# Patient Record
Sex: MALE | Race: WHITE | NOT HISPANIC OR LATINO | Employment: OTHER | URBAN - METROPOLITAN AREA
[De-identification: names, ages, dates, MRNs, and addresses within clinical notes are randomized per-mention and may not be internally consistent; named-entity substitution may affect disease eponyms.]

---

## 2017-08-10 ENCOUNTER — COMMUNICATION - HEALTHEAST (OUTPATIENT)
Dept: FAMILY MEDICINE | Facility: CLINIC | Age: 67
End: 2017-08-10

## 2017-08-15 ENCOUNTER — OFFICE VISIT - HEALTHEAST (OUTPATIENT)
Dept: FAMILY MEDICINE | Facility: CLINIC | Age: 67
End: 2017-08-15

## 2017-08-15 DIAGNOSIS — G56.22 ULNAR NEUROPATHY OF LEFT UPPER EXTREMITY: ICD-10-CM

## 2017-09-07 ENCOUNTER — OFFICE VISIT - HEALTHEAST (OUTPATIENT)
Dept: FAMILY MEDICINE | Facility: CLINIC | Age: 67
End: 2017-09-07

## 2017-09-07 DIAGNOSIS — G56.22 ULNAR NEUROPATHY OF LEFT UPPER EXTREMITY: ICD-10-CM

## 2017-09-07 DIAGNOSIS — Z01.818 PREOP EXAMINATION: ICD-10-CM

## 2017-09-07 DIAGNOSIS — H26.9 CATARACT: ICD-10-CM

## 2017-09-07 ASSESSMENT — MIFFLIN-ST. JEOR: SCORE: 1447.68

## 2017-10-09 ENCOUNTER — RECORDS - HEALTHEAST (OUTPATIENT)
Dept: ADMINISTRATIVE | Facility: OTHER | Age: 67
End: 2017-10-09

## 2017-10-11 ENCOUNTER — OFFICE VISIT - HEALTHEAST (OUTPATIENT)
Dept: FAMILY MEDICINE | Facility: CLINIC | Age: 67
End: 2017-10-11

## 2017-10-11 DIAGNOSIS — S60.451A FOREIGN BODY OF LEFT INDEX FINGER: ICD-10-CM

## 2018-04-25 ENCOUNTER — OFFICE VISIT - HEALTHEAST (OUTPATIENT)
Dept: FAMILY MEDICINE | Facility: CLINIC | Age: 68
End: 2018-04-25

## 2018-04-25 DIAGNOSIS — M77.02 MEDIAL EPICONDYLITIS OF ELBOW, LEFT: ICD-10-CM

## 2018-04-25 DIAGNOSIS — J43.9 EMPHYSEMA OF LUNG (H): ICD-10-CM

## 2018-04-25 DIAGNOSIS — Z00.00 HEALTH CARE MAINTENANCE: ICD-10-CM

## 2018-04-25 DIAGNOSIS — Z72.0 TOBACCO USE: ICD-10-CM

## 2018-04-25 DIAGNOSIS — F17.200 TOBACCO USE DISORDER: ICD-10-CM

## 2018-05-01 ENCOUNTER — RECORDS - HEALTHEAST (OUTPATIENT)
Dept: ADMINISTRATIVE | Facility: OTHER | Age: 68
End: 2018-05-01

## 2018-05-03 ENCOUNTER — HOSPITAL ENCOUNTER (OUTPATIENT)
Dept: CT IMAGING | Facility: HOSPITAL | Age: 68
Discharge: HOME | End: 2018-05-03
Attending: FAMILY MEDICINE

## 2018-05-03 DIAGNOSIS — Z72.0 TOBACCO USE: ICD-10-CM

## 2018-05-07 ENCOUNTER — COMMUNICATION - HEALTHEAST (OUTPATIENT)
Dept: FAMILY MEDICINE | Facility: CLINIC | Age: 68
End: 2018-05-07

## 2018-05-07 DIAGNOSIS — R19.5 POSITIVE COLORECTAL CANCER SCREENING USING DNA-BASED STOOL TEST: ICD-10-CM

## 2018-05-22 ENCOUNTER — OFFICE VISIT - HEALTHEAST (OUTPATIENT)
Dept: FAMILY MEDICINE | Facility: CLINIC | Age: 68
End: 2018-05-22

## 2018-05-22 DIAGNOSIS — G56.22 ULNAR NEUROPATHY OF LEFT UPPER EXTREMITY: ICD-10-CM

## 2018-07-12 ENCOUNTER — RECORDS - HEALTHEAST (OUTPATIENT)
Dept: ADMINISTRATIVE | Facility: OTHER | Age: 68
End: 2018-07-12

## 2018-07-24 ENCOUNTER — AMBULATORY - HEALTHEAST (OUTPATIENT)
Dept: PHYSICAL MEDICINE AND REHAB | Facility: CLINIC | Age: 68
End: 2018-07-24

## 2018-07-24 ENCOUNTER — OFFICE VISIT - HEALTHEAST (OUTPATIENT)
Dept: FAMILY MEDICINE | Facility: CLINIC | Age: 68
End: 2018-07-24

## 2018-07-24 DIAGNOSIS — G56.22 ULNAR NEUROPATHY OF LEFT UPPER EXTREMITY: ICD-10-CM

## 2018-07-24 DIAGNOSIS — D12.6 ADENOMATOUS COLON POLYP: ICD-10-CM

## 2018-08-09 ENCOUNTER — RECORDS - HEALTHEAST (OUTPATIENT)
Dept: ADMINISTRATIVE | Facility: OTHER | Age: 68
End: 2018-08-09

## 2018-08-09 ENCOUNTER — HOSPITAL ENCOUNTER (OUTPATIENT)
Dept: PHYSICAL MEDICINE AND REHAB | Facility: CLINIC | Age: 68
Discharge: HOME | End: 2018-08-09
Attending: FAMILY MEDICINE

## 2018-08-09 DIAGNOSIS — G56.22 ULNAR NEUROPATHY OF LEFT UPPER EXTREMITY: ICD-10-CM

## 2018-08-10 ENCOUNTER — AMBULATORY - HEALTHEAST (OUTPATIENT)
Dept: FAMILY MEDICINE | Facility: CLINIC | Age: 68
End: 2018-08-10

## 2018-08-10 DIAGNOSIS — G56.22 ULNAR NEUROPATHY OF LEFT UPPER EXTREMITY: ICD-10-CM

## 2018-08-15 ENCOUNTER — RECORDS - HEALTHEAST (OUTPATIENT)
Dept: ADMINISTRATIVE | Facility: OTHER | Age: 68
End: 2018-08-15

## 2018-09-12 ENCOUNTER — RECORDS - HEALTHEAST (OUTPATIENT)
Dept: ADMINISTRATIVE | Facility: OTHER | Age: 68
End: 2018-09-12

## 2018-09-19 ENCOUNTER — OFFICE VISIT - HEALTHEAST (OUTPATIENT)
Dept: FAMILY MEDICINE | Facility: CLINIC | Age: 68
End: 2018-09-19

## 2018-09-19 DIAGNOSIS — G56.22 ULNAR NEUROPATHY OF LEFT UPPER EXTREMITY: ICD-10-CM

## 2018-09-19 DIAGNOSIS — Z01.818 PREOP GENERAL PHYSICAL EXAM: ICD-10-CM

## 2018-09-19 DIAGNOSIS — F17.200 TOBACCO USE DISORDER: ICD-10-CM

## 2018-09-19 LAB
ATRIAL RATE - MUSE: 66 BPM
DIASTOLIC BLOOD PRESSURE - MUSE: NORMAL MMHG
ERYTHROCYTE [DISTWIDTH] IN BLOOD BY AUTOMATED COUNT: 10.8 % (ref 11–14.5)
HCT VFR BLD AUTO: 40 % (ref 40–54)
HGB BLD-MCNC: 13.3 G/DL (ref 14–18)
INTERPRETATION ECG - MUSE: NORMAL
MCH RBC QN AUTO: 31.8 PG (ref 27–34)
MCHC RBC AUTO-ENTMCNC: 33.2 G/DL (ref 32–36)
MCV RBC AUTO: 96 FL (ref 80–100)
P AXIS - MUSE: 36 DEGREES
PLATELET # BLD AUTO: 291 THOU/UL (ref 140–440)
PMV BLD AUTO: 6.5 FL (ref 7–10)
PR INTERVAL - MUSE: 118 MS
QRS DURATION - MUSE: 94 MS
QT - MUSE: 386 MS
QTC - MUSE: 404 MS
R AXIS - MUSE: 14 DEGREES
RBC # BLD AUTO: 4.18 MILL/UL (ref 4.4–6.2)
SYSTOLIC BLOOD PRESSURE - MUSE: NORMAL MMHG
T AXIS - MUSE: 42 DEGREES
VENTRICULAR RATE- MUSE: 66 BPM
WBC: 4.7 THOU/UL (ref 4–11)

## 2018-09-19 ASSESSMENT — MIFFLIN-ST. JEOR: SCORE: 1396.65

## 2018-09-27 ENCOUNTER — RECORDS - HEALTHEAST (OUTPATIENT)
Dept: ADMINISTRATIVE | Facility: OTHER | Age: 68
End: 2018-09-27

## 2018-10-10 ENCOUNTER — RECORDS - HEALTHEAST (OUTPATIENT)
Dept: ADMINISTRATIVE | Facility: OTHER | Age: 68
End: 2018-10-10

## 2019-01-21 ENCOUNTER — COMMUNICATION - HEALTHEAST (OUTPATIENT)
Dept: FAMILY MEDICINE | Facility: CLINIC | Age: 69
End: 2019-01-21

## 2019-01-21 DIAGNOSIS — N52.9 ERECTILE DYSFUNCTION, UNSPECIFIED ERECTILE DYSFUNCTION TYPE: ICD-10-CM

## 2019-06-04 ENCOUNTER — OFFICE VISIT - HEALTHEAST (OUTPATIENT)
Dept: PODIATRY | Facility: CLINIC | Age: 69
End: 2019-06-04

## 2019-06-04 DIAGNOSIS — L60.0 INGROWN TOENAIL: ICD-10-CM

## 2019-06-12 ENCOUNTER — OFFICE VISIT - HEALTHEAST (OUTPATIENT)
Dept: PODIATRY | Facility: CLINIC | Age: 69
End: 2019-06-12

## 2019-06-12 DIAGNOSIS — L60.0 INGROWN TOENAIL: ICD-10-CM

## 2019-06-12 ASSESSMENT — MIFFLIN-ST. JEOR: SCORE: 1396.65

## 2019-11-13 ENCOUNTER — OFFICE VISIT - HEALTHEAST (OUTPATIENT)
Dept: FAMILY MEDICINE | Facility: CLINIC | Age: 69
End: 2019-11-13

## 2019-11-13 DIAGNOSIS — S61.011A LACERATION OF RIGHT THUMB WITHOUT FOREIGN BODY WITHOUT DAMAGE TO NAIL, INITIAL ENCOUNTER: ICD-10-CM

## 2020-06-16 PROBLEM — D12.6 ADENOMATOUS COLON POLYP: Status: ACTIVE | Noted: 2018-07-16

## 2020-06-16 PROBLEM — F17.200 NICOTINE DEPENDENCE: Status: ACTIVE | Noted: 2020-06-16

## 2020-06-17 ASSESSMENT — MIFFLIN-ST. JEOR: SCORE: 1409.03

## 2020-06-17 NOTE — PROGRESS NOTES
Chief Complaint   Patient presents with     Consult For     Possible ulnar neuropathy-was seen at [facility] in january 2020 and states symptoms have continued in left arm      Patient will be coming in face to face.  EMG after. Passed COVID screening during precharting.   Demarco Santos on 6/17/2020 at 10:38 AM

## 2020-06-19 ENCOUNTER — OFFICE VISIT (OUTPATIENT)
Dept: NEUROLOGY | Facility: CLINIC | Age: 70
End: 2020-06-19
Payer: MEDICARE

## 2020-06-19 ENCOUNTER — RECORDS - HEALTHEAST (OUTPATIENT)
Dept: ADMINISTRATIVE | Facility: OTHER | Age: 70
End: 2020-06-19

## 2020-06-19 VITALS
BODY MASS INDEX: 23.54 KG/M2 | WEIGHT: 150 LBS | HEIGHT: 67 IN | HEART RATE: 69 BPM | SYSTOLIC BLOOD PRESSURE: 138 MMHG | DIASTOLIC BLOOD PRESSURE: 83 MMHG

## 2020-06-19 DIAGNOSIS — G56.02 CARPAL TUNNEL SYNDROME OF LEFT WRIST: Primary | ICD-10-CM

## 2020-06-19 PROCEDURE — 95909 NRV CNDJ TST 5-6 STUDIES: CPT | Performed by: PSYCHIATRY & NEUROLOGY

## 2020-06-19 PROCEDURE — 99207 ZZC NO CHARGE LOS: CPT | Performed by: PSYCHIATRY & NEUROLOGY

## 2020-06-19 PROCEDURE — 95886 MUSC TEST DONE W/N TEST COMP: CPT | Mod: LT | Performed by: PSYCHIATRY & NEUROLOGY

## 2020-06-19 NOTE — PROGRESS NOTES
Brief note:    This patient was scheduled for a neurologic consultation today but he did not come to this appointment.  He did come later to the EMG appointment and the EMG report is visible under procedures.

## 2020-06-19 NOTE — LETTER
6/19/2020         RE: Rip Crockett  [address] [address]        Dear Colleague,    Thank you for referring your patient, Rip Crockett, to the [facility] NEUROLOGY [place]. Please see a copy of my visit note below.    See EMG report under procedures    Again, thank you for allowing me to participate in the care of your patient.        Sincerely,        Dat Alvarado MD

## 2020-06-19 NOTE — NURSING NOTE
Chief Complaint   Patient presents with     Arm Pain     Possible ulnar neuropathy-was seen at [facility] in january 2020 and states symptoms have continued in left arm      Dannielle Qiu on 6/19/2020 at 9:39 AM

## 2020-06-19 NOTE — LETTER
6/19/2020         RE: Rip Crockett  [address] [address]        Dear Colleague,    Thank you for referring your patient, Rip Crockett, to the [facility] NEUROLOGY [place]. Please see a copy of my visit note below.    Chief Complaint   Patient presents with     Consult For     Possible ulnar neuropathy-was seen at [facility] in january 2020 and states symptoms have continued in left arm      Patient will be coming in face to face.  EMG after. Passed COVID screening during precharting.   Demarco Santos on 6/17/2020 at 10:38 AM       Brief note:    This patient was scheduled for a neurologic consultation today but he did not come to this appointment.  He did come later to the EMG appointment and the EMG report is visible under procedures.    Again, thank you for allowing me to participate in the care of your patient.        Sincerely,        Dat Alvarado MD

## 2020-06-19 NOTE — PROCEDURES
[facility]    (Formerly) [facility]  [address] [place] [address], [address]  [place] [address]  Tel: [phone]  Fax: [phone]          Full Name: Rip Crockett Gender: Male  Patient ID: [id number] YOB: 1950      Visit Date: 6/19/2020 09:45  Age: 70 Years 1 Months Old  Interpreted By: Dat Alvarado MD   Ref Dr.: Luke Guan MD  Tech:    EDX Exam: EMG   Height: 5 feet 7 inch  Reason for referral: Evaluate left upper. c/o pain in left arm > 4 months. h/o ulnar surgery.       Motor NCS      Nerve / Sites Lat Amp Dist Rivera    ms mV cm m/s   L Median - APB      Wrist 4.90 3.9 7       Elbow 9.64 3.6 24 51   L Ulnar - ADM      Wrist 3.39 14.6 7       B.Elbow 7.19 14.5 21 55      A.Elbow 8.96 14.5 10 56       F  Wave      Nerve Fmin    ms   L Ulnar - ADM 31.25       Sensory NCS      Nerve / Sites Onset Lat Pk Lat PP Amp Dist    ms ms  V cm   L Median - II (Antidr)      Wrist 3.18 4.06 17.1 13   L Ulnar - V (Antidr)      Wrist 2.97 3.75 6.7 11   L Median, Ulnar - Transcarpal comparison      Median Palm 2.19 2.81 34.7 8      Ulnar Palm 1.98 2.45 8.2 8       EMG Summary Table     Spontaneous MUAP Rcmt Note   Muscle Fib PSW Fasc IA # Amp Dur PPP Rate Type   L. Brachioradialis None None None N N N N N N N   L. Pronator teres None None None N N N N N N N   L. Biceps brachii None None None N N N N N N N   L. Deltoid None None None N N N N N N N   L. Triceps brachii None None None N N N N N N N   L. Flexor carpi ulnaris None None None N N N N N N N   L. Extensor digitorum communis None None None N N N N N N N   L. Abductor digiti minimi (manus) None None None N N N N N N N   L. First dorsal interosseous None None None N N N N N N N   L. Abductor pollicis brevis None None None N N N N N N N       History:  This patient is a 70-year-old man with left elbow pain.  He did have ulnar nerve decompression surgery which did improve paresthesias in the left hand.   This EMG is performed to evaluate for nerve or root impingement.    Findings:  Motor nerve conduction study of the left median nerve shows slightly prolonged distal motor latency with mildly reduced amplitude and normal proximal conduction velocity.  The left ulnar motor study again shows just slight prolongation of distal motor latency.  Amplitude is robust and proximal conduction velocities are normal, including velocity across the elbow segment.    F-wave latency of the left ulnar nerve is borderline prolonged.    Sensory study of the left median nerve again shows very slight slowing, amplitude is preserved.  The left ulnar sensory study shows normal latency but mildly reduced amplitude.    Needle EMG of selected muscles throughout the left arm including first dorsal interosseous and abductor pollicis brevis shows no abnormal spontaneous activity and motor units of normal configuration and recruitment.    Conclusion:  This study shows very slight median neuropathy at the wrist on the left consistent with carpal tunnel syndrome.  The ulnar sensory study shows mildly reduced amplitude which likely represents some prior nerve impingement.  There is no slowing to suggest ongoing left ulnar nerve damage.    Comment:  The left elbow pain is likely musculoskeletal given the minimal findings on today's EMG test.  The patient reports that he is able to live with the symptoms as they are now, mainly wishing to rule out anything worrisome.

## 2020-08-18 ENCOUNTER — COMMUNICATION - HEALTHEAST (OUTPATIENT)
Dept: FAMILY MEDICINE | Facility: CLINIC | Age: 70
End: 2020-08-18

## 2021-01-04 ENCOUNTER — HEALTH MAINTENANCE LETTER (OUTPATIENT)
Age: 71
End: 2021-01-04

## 2021-03-22 ENCOUNTER — COMMUNICATION - HEALTHEAST (OUTPATIENT)
Dept: FAMILY MEDICINE | Facility: CLINIC | Age: 71
End: 2021-03-22

## 2021-03-22 DIAGNOSIS — N52.9 ERECTILE DYSFUNCTION, UNSPECIFIED ERECTILE DYSFUNCTION TYPE: ICD-10-CM

## 2021-04-20 ENCOUNTER — OFFICE VISIT - HEALTHEAST (OUTPATIENT)
Dept: FAMILY MEDICINE | Facility: CLINIC | Age: 71
End: 2021-04-20

## 2021-04-20 DIAGNOSIS — N52.9 ERECTILE DYSFUNCTION, UNSPECIFIED ERECTILE DYSFUNCTION TYPE: ICD-10-CM

## 2021-04-20 DIAGNOSIS — D12.6 ADENOMATOUS POLYP OF COLON, UNSPECIFIED PART OF COLON: ICD-10-CM

## 2021-04-20 DIAGNOSIS — R03.0 ELEVATED BP WITHOUT DIAGNOSIS OF HYPERTENSION: ICD-10-CM

## 2021-04-20 DIAGNOSIS — R35.1 NOCTURIA: ICD-10-CM

## 2021-04-20 DIAGNOSIS — Z87.891 FORMER SMOKER: ICD-10-CM

## 2021-04-20 LAB
ALBUMIN SERPL-MCNC: 4.1 G/DL (ref 3.5–5)
ALP SERPL-CCNC: 54 U/L (ref 45–120)
ALT SERPL W P-5'-P-CCNC: 21 U/L (ref 0–45)
ANION GAP SERPL CALCULATED.3IONS-SCNC: 10 MMOL/L (ref 5–18)
AST SERPL W P-5'-P-CCNC: 24 U/L (ref 0–40)
BILIRUB SERPL-MCNC: 0.6 MG/DL (ref 0–1)
BUN SERPL-MCNC: 13 MG/DL (ref 8–22)
CALCIUM SERPL-MCNC: 9.1 MG/DL (ref 8.5–10.5)
CHLORIDE BLD-SCNC: 100 MMOL/L (ref 98–107)
CHOLEST SERPL-MCNC: 192 MG/DL
CO2 SERPL-SCNC: 25 MMOL/L (ref 22–31)
CREAT SERPL-MCNC: 0.78 MG/DL (ref 0.7–1.3)
ERYTHROCYTE [DISTWIDTH] IN BLOOD BY AUTOMATED COUNT: 12 % (ref 11–14.5)
FASTING STATUS PATIENT QL REPORTED: NO
GFR SERPL CREATININE-BSD FRML MDRD: >60 ML/MIN/1.73M2
GLUCOSE BLD-MCNC: 97 MG/DL (ref 70–125)
HCT VFR BLD AUTO: 42.6 % (ref 40–54)
HDLC SERPL-MCNC: 84 MG/DL
HGB BLD-MCNC: 14 G/DL (ref 14–18)
LDLC SERPL CALC-MCNC: 97 MG/DL
MCH RBC QN AUTO: 32.1 PG (ref 27–34)
MCHC RBC AUTO-ENTMCNC: 32.9 G/DL (ref 32–36)
MCV RBC AUTO: 98 FL (ref 80–100)
PLATELET # BLD AUTO: 255 THOU/UL (ref 140–440)
PMV BLD AUTO: 8.8 FL (ref 7–10)
POTASSIUM BLD-SCNC: 4.6 MMOL/L (ref 3.5–5)
PROT SERPL-MCNC: 6.8 G/DL (ref 6–8)
PSA SERPL-MCNC: 0.6 NG/ML (ref 0–4.5)
RBC # BLD AUTO: 4.36 MILL/UL (ref 4.4–6.2)
SODIUM SERPL-SCNC: 135 MMOL/L (ref 136–145)
TRIGL SERPL-MCNC: 53 MG/DL
WBC: 5.4 THOU/UL (ref 4–11)

## 2021-04-20 RX ORDER — SILDENAFIL CITRATE 20 MG/1
TABLET ORAL
Qty: 40 TABLET | Refills: 2 | Status: SHIPPED | OUTPATIENT
Start: 2021-04-20 | End: 2021-12-14

## 2021-04-23 ENCOUNTER — COMMUNICATION - HEALTHEAST (OUTPATIENT)
Dept: FAMILY MEDICINE | Facility: CLINIC | Age: 71
End: 2021-04-23

## 2021-05-18 ENCOUNTER — HOSPITAL ENCOUNTER (OUTPATIENT)
Dept: CT IMAGING | Facility: HOSPITAL | Age: 71
Discharge: HOME | End: 2021-05-18
Attending: FAMILY MEDICINE
Payer: MEDICARE

## 2021-05-18 ENCOUNTER — COMMUNICATION - HEALTHEAST (OUTPATIENT)
Dept: FAMILY MEDICINE | Facility: CLINIC | Age: 71
End: 2021-05-18

## 2021-05-18 DIAGNOSIS — Z87.891 FORMER SMOKER: ICD-10-CM

## 2021-05-29 NOTE — PROGRESS NOTES
"FOOT AND ANKLE SURGERY/PODIATRY Progress Note        ASSESSMENT:   Ingrown Nail       TREATMENT:  -There is an ingrown nail on the lateral border left hallux. We discussed both temporary and permanent nail removal today. Because she has had a temporary nail avulsion procedure performed in the past, I recommend a permanent nail avulsion today. He understands that the nail may become symptomatic in 2% of cases. He consents to the procedure today.  -5 cc of 1% lidocaine plain was injected into the left hallux. The digit was cleansed with betadine swab. Following the application of a digital Tourni-cot the following procedures were performed.  Attention was directed to the lateral border of the left hallux toenail where utilizing an [place] elevator and an English anvil nail splitter the nail was split from distal to proximal to the level of the epionychium.  Next the offending border was removed.  3,30 second applications of phenol were applied to the matrix.  The wound was then flushed with copious amounts of alcohol. The Tourni-cot was removed. A dressing was applied comprising of silvadene, 2x2 and 1\" coban wrap.  The tourniquet was removed and normal color returned.    -Post-op instructions were dispensed. All questions invited and answered.   -I would like to see the patient again in one week for follow-up.     Hugh Sebastian DPM  [facility] Foot and Ankle Surgery/Podiatry       HPI: I was asked to see Rip Crockett today for a chronic painful ingrown nail on the left hallux. The patient states he has had pain associated with the ingrown nail for several years. He has tried to remove the ingrown nail himself with limited success.     Past Medical History:   Diagnosis Date     Sprain Of The Left Hip     Created by Conversion        No past surgical history on file.    Allergies   Allergen Reactions     Penicillins      Cephalosporins Rash     Keflex [Cephalexin] Hives and Rash         Current Outpatient Medications: "      ibuprofen (ADVIL,MOTRIN) 200 MG tablet, Take 200 mg by mouth every 6 (six) hours as needed for pain., Disp: , Rfl:      sildenafil, antihypertensive, (REVATIO) 20 mg tablet, 2-3 po 1 hour prior to sex as needed, Disp: 30 tablet, Rfl: 3    No family history on file.    Social History     Socioeconomic History     Marital status:      Spouse name: Not on file     Number of children: Not on file     Years of education: Not on file     Highest education level: Not on file   Occupational History     Not on file   Social Needs     Financial resource strain: Not on file     Food insecurity:     Worry: Not on file     Inability: Not on file     Transportation needs:     Medical: Not on file     Non-medical: Not on file   Tobacco Use     Smoking status: Former Smoker     Packs/day: 1.00     Types: Cigarettes     Smokeless tobacco: Former User     Quit date: 6/10/2016     Tobacco comment: smoking since age 20 years old per pt   Substance and Sexual Activity     Alcohol use: Yes     Comment: 3-5 beers per day     Drug use: No     Sexual activity: Not on file   Lifestyle     Physical activity:     Days per week: Not on file     Minutes per session: Not on file     Stress: Not on file   Relationships     Social connections:     Talks on phone: Not on file     Gets together: Not on file     Attends Jewish service: Not on file     Active member of club or organization: Not on file     Attends meetings of clubs or organizations: Not on file     Relationship status: Not on file     Intimate partner violence:     Fear of current or ex partner: Not on file     Emotionally abused: Not on file     Physically abused: Not on file     Forced sexual activity: Not on file   Other Topics Concern     Not on file   Social History Narrative     Not on file       10 point Review of Systems is negative        Vitals:    06/04/19 0852   BP: 138/76   Pulse: 64   Temp: 98.6  F (37  C)       BMI= There is no height or weight on file to  calculate BMI.      OBJECTIVE:  Appearance: alert, well appearing, and in no distress.    Vitals:    06/04/19 0852   BP: 138/76   Pulse: 64   Temp: 98.6  F (37  C)       Vascular: Dorsalis pedis and posterior tibial pulses are palpable. There is pedal hair growth.  CFT < 3 sec from anterior tibial surface to distal digits bilaterally. There is no appreciable edema noted.  Dermatologic: Incurvated nail border along the lateral border left hallux. No erythema.   Neurologic: All epicritic and proprioceptive sensations are grossly intact bilaterally. There is no Babinski, parasthesias, Tinel's, or dysthesias.  Musculoskeletal: Mild pain lateral border left hallux.     Imaging: None

## 2021-05-29 NOTE — PATIENT INSTRUCTIONS - HE
Post Nail Excision Instructions      Keep bandages clean, dry, and intact for the rest of the day of surgery.     The day after surgery, remove all bandages    Soak foot in warm water with Epsom Salt for 10 minutes    Dry feet thoroughly     Apply a Band-Aid to the affected area    Continue this process daily for the next two weeks following the procedure    General bathing does not replace daily foot soaks    Do not anticipate severe pain. But if you do experience some discomfort, you can take Ibuprofen (Advil)    You can expect some drainage and weeping from the area. This may last anywhere from several days to several weeks. This is NORMAL.    If you experience any increase in pain, any swelling, or odor call our office immediately. As this may be a sign of infection.    If you have any questions in the meantime, please do not hesitate to call Podiatry at [phone]

## 2021-05-31 VITALS — BODY MASS INDEX: 23.79 KG/M2 | WEIGHT: 157 LBS | HEIGHT: 68 IN

## 2021-05-31 VITALS — BODY MASS INDEX: 25 KG/M2 | WEIGHT: 162 LBS

## 2021-05-31 VITALS — BODY MASS INDEX: 24.8 KG/M2 | WEIGHT: 161.9 LBS

## 2021-06-01 ENCOUNTER — AMBULATORY - HEALTHEAST (OUTPATIENT)
Dept: OTHER | Facility: CLINIC | Age: 71
End: 2021-06-01

## 2021-06-01 ENCOUNTER — DOCUMENTATION ONLY (OUTPATIENT)
Dept: OTHER | Facility: CLINIC | Age: 71
End: 2021-06-01

## 2021-06-01 VITALS — WEIGHT: 160 LBS | BODY MASS INDEX: 24.51 KG/M2

## 2021-06-01 VITALS — BODY MASS INDEX: 23.28 KG/M2 | WEIGHT: 152 LBS

## 2021-06-01 VITALS — BODY MASS INDEX: 24.05 KG/M2 | WEIGHT: 157 LBS

## 2021-06-02 ENCOUNTER — RECORDS - HEALTHEAST (OUTPATIENT)
Dept: ADMINISTRATIVE | Facility: OTHER | Age: 71
End: 2021-06-02

## 2021-06-02 VITALS — WEIGHT: 151 LBS | BODY MASS INDEX: 24.27 KG/M2 | HEIGHT: 66 IN

## 2021-06-02 DIAGNOSIS — R03.0 ELEVATED BP WITHOUT DIAGNOSIS OF HYPERTENSION: ICD-10-CM

## 2021-06-02 RX ORDER — LOSARTAN POTASSIUM 50 MG/1
50 TABLET ORAL DAILY
Qty: 30 TABLET | Refills: 3 | Status: SHIPPED | OUTPATIENT
Start: 2021-06-02 | End: 2021-08-16

## 2021-06-03 VITALS — WEIGHT: 151 LBS | BODY MASS INDEX: 24.27 KG/M2 | HEIGHT: 66 IN

## 2021-06-03 NOTE — PROGRESS NOTES
Walk In Care Note                                                                                 Date of Visit: 11/13/2019     Chief Complaint   Rip Crockett is a(n) 69 y.o. White or  male who presents to Walk In Care with the following complaint(s):  laceration on right thumb - occurred 3-4 days ago (wants a stitch or two placed. )       Assessment and Plan   1. Laceration of right thumb without foreign body without damage to nail, initial encounter      Patient presents requesting wound repair of a laceration on the distal phalanx of the right thumb.  Injury occurred 3-4 days ago.  Wound has reportedly doubled in length, width, and depth since then due to fissuring of dry skin at the wound edges.  Advised patient that wound repair is not appropriate at this time and would require freshening of wound edges if it were to be attempted.  Advised patient that healing by secondary intention is most appropriate in this situation.  Wound has no signs of secondary infection at this time.  Tension was removed from the wound by placing a Steri-Strip across it secured with Mastisol.  Home cares were discussed, as were indications for reevaluation.    Counseled patient regarding assessment and plan for evaluation and treatment. Questions were answered. See AVS for the specific written instructions and educational handout(s) regarding old laceration that has not been repaired that were provided at the conclusion of the visit.     Discussed signs / symptoms that warrant urgent / emergent medical attention.     Follow up as needed.     History of Present Illness   Date of injury: 3 or 4 days ago  Location injury occurred: Home  Mechanism of injury: Was cutting a pepper with a knife and cut the side of his right thumb.   Symptoms: Had a small wound that bled minimally. Washed the area and covered it with antibiotic ointment and a bandage. Has not been covering the wound for the past couple of days. Reports that the cut  has doubled in length, width, and depth since the time of injury due to dryness / cracking in the area. Wound is painful but is not bleeding / draining. Denies fevers / chills. Presents today requesting placement of one or two sutures in the wound to promote healing.   Hand dominance: Right  Date of last Tetanus vaccine: 10/11/2017 (Tdap)  Home therapies utilized: Antibiotic ointment and a bandage for the first 2 days after injury.   History of previous injury to affect area: Has issues with cracked / fissured hands during the winter months.      Review of Systems   Review of Systems   All other systems reviewed and are negative.       Physical Exam   There were no vitals filed for this visit.  Physical Exam  Vitals signs and nursing note reviewed.   Constitutional:       General: He is not in acute distress.     Appearance: He is well-developed and normal weight. He is not ill-appearing or toxic-appearing.   Cardiovascular:      Heart sounds: S1 normal.   Musculoskeletal:      Right hand: He exhibits laceration (11 mm long x 1 mm wide healing laceration on the mid ulnar side of the distal phalanx of the right thumb; wound is hemostatic with granulation tissue in the base, but wound edges are thick and dry). He exhibits normal range of motion, no tenderness, no bony tenderness and normal capillary refill. Normal sensation noted. Normal strength noted.   Skin:     General: Skin is warm and dry.      Coloration: Skin is not pale.      Findings: No rash.   Neurological:      General: No focal deficit present.      Mental Status: He is alert and oriented to person, place, and time.          Diagnostic Studies   Laboratory:  N/A  Radiology:  N/A  Electrocardiogram:  N/A     Procedure Note   N/A     Pertinent History   The following portions of the patient's history were reviewed and updated as appropriate: allergies, current medications, past family history, past medical history, past social history, past surgical  history and problem list.    Patient has Nicotine Dependence and Adenomatous colon polyp on their problem list.    Patient has a past medical history of Sprain Of The Left Hip.    Patient has no past surgical history on file.    Patient's family history is not on file.    Patient reports that he has quit smoking. His smoking use included cigarettes. He smoked 1.00 pack per day. He quit smokeless tobacco use about 3 years ago. He reports current alcohol use. He reports that he does not use drugs.     Portions of this note have been dictated using voice recognition software. Any grammatical or context distortions are unintentional and inherent to the software.     Rick Saini MD  [facility]

## 2021-06-04 VITALS
TEMPERATURE: 97.7 F | RESPIRATION RATE: 16 BRPM | SYSTOLIC BLOOD PRESSURE: 136 MMHG | HEART RATE: 68 BPM | WEIGHT: 150.1 LBS | DIASTOLIC BLOOD PRESSURE: 70 MMHG | OXYGEN SATURATION: 99 % | BODY MASS INDEX: 24.04 KG/M2

## 2021-06-05 VITALS
RESPIRATION RATE: 12 BRPM | BODY MASS INDEX: 24.92 KG/M2 | HEART RATE: 70 BPM | SYSTOLIC BLOOD PRESSURE: 142 MMHG | DIASTOLIC BLOOD PRESSURE: 78 MMHG | WEIGHT: 159.12 LBS | TEMPERATURE: 97.7 F

## 2021-06-12 NOTE — PROGRESS NOTES
Subjective: Patient comes in for irritation regarding nerve.  He has had previous evaluation back in January 2016 for ulnar neuropathy also had an MRI of the cervical spine.  There was no significant impingement than mild degenerative changes.    Patient does not have any pain with volar flexion at the elbow he does have some slight tenderness to palpation over the ulnar groove.    His neck was nontender    He has been on ibuprofen now taken 2 of them over-the-counter 4 times a day.  It does help some.    Patient in the past had been on Medrol and that was helpful.    He did not have any side effects from it.    Patient will be following up in the next month.  In about 6 weeks is having cataract surgery    Tobacco status: He  reports that he has quit smoking. His smoking use included Cigarettes. He smoked 1.00 pack per day. He quit smokeless tobacco use about 14 months ago.    Patient Active Problem List    Diagnosis Date Noted     Nicotine Dependence        Current Outpatient Prescriptions   Medication Sig Dispense Refill     cycloSPORINE (RESTASIS) 0.05 % ophthalmic emulsion 1 drop 2 (two) times a day.       ibuprofen (ADVIL,MOTRIN) 200 MG tablet Take 200 mg by mouth every 6 (six) hours as needed for pain.       methylPREDNISolone (MEDROL DOSEPACK) 4 mg tablet follow package directions 21 tablet 0     No current facility-administered medications for this visit.        ROS:   Review of systems negative other than as outlined above    Objective:    /80 (Patient Site: Left Arm, Patient Position: Sitting, Cuff Size: Adult Large)  Pulse 68  Resp 16  Wt 162 lb (73.5 kg)  BMI 25 kg/m2  Body mass index is 25 kg/(m^2).      General appearance no acute distress    Vital signs are stable he is afebrile    Lungs are clear no rales rhonchi heart regular S1-S2    His arm had some slight tenderness at the ulnar groove.  He complains of tingling and burning in through the fourth and fifth fingers and along the ulnar  distribution up to the elbow.    His neck was supple no tenderness no guarding or rebound    Shoulder with full range of motion.  Graft skin was normal    Pulses normal and upper extremity        Assessment:  1. Ulnar neuropathy of left upper extremity  methylPREDNISolone (MEDROL DOSEPACK) 4 mg tablet     2.  Cataract upcoming surgery at the end of September    We will treat with decreasing use of the left arm, try to lift with palms down    Medrol Dosepak    Plan: Follow-up if not improving consider referral to Orth O    This transcription uses voice recognition software, which may contain typographical errors.

## 2021-06-12 NOTE — PROGRESS NOTES
[facility] PRE-OPERATIVE VISIT FOR:    Rip Crockett,  1950, MRN [id number]    Upcoming surgery date: 2017 and 10/2/17  Surgeon: Dr. Michelle  Surgery Facility: [facility] [place], fax number [phone]    Chief Complaint: History of decreasing vision worse on the left than the right.  Plan for cataract surgery initially on the left than the right.    PCP: Dat Francis MD, [phone]    SUBJECTIVE:  History of Present Illness:   Rip Crockett is a 67 y.o. male with history as outlined above.  Patient's had previous history of some cervical radiculopathy in the left and ulnar neuropathy had been on a Medrol pack a week or so to ago that improved things now has some symptoms back.    Past Medical History:  Patient Active Problem List   Diagnosis     Nicotine Dependence   Patient is now stop smoking    History of ulnar neuropathy as well as cervical radiculopathy    Past surgical history #1 vasectomy, #2 left hernia repair    History of bleeding: Negative    History of tobacco use: Previous but now not smoking.  Alcohol +5-6 beers per day    History of anesthesia reactions: Negative    Social History:  he  reports that he has quit smoking. His smoking use included Cigarettes. He smoked 1.00 pack per day. He quit smokeless tobacco use about 15 months ago. He reports that he drinks alcohol. He reports that he does not use illicit drugs.    Family History:  No family history on file.  Family history for COPD in his mother    No family history for anesthesia or bleeding problems      Current Medications:  Current Outpatient Prescriptions   Medication Sig Dispense Refill     ibuprofen (ADVIL,MOTRIN) 200 MG tablet Take 200 mg by mouth every 6 (six) hours as needed for pain.       No current facility-administered medications for this visit.        Allergies:  Penicillins; Cephalosporins; and Keflex [cephalexin]    Review or Systems:  Constitution: normal  HEENT: Decreased bilateral vision as  outlined above  Pulmonary: normal  Cardiovascular: normal  GI: normal  : normal  Musculoskeletal: Pain in through the left arm and elbow and through the ulnar distribution.  Tenderness through the medial epicondyle area  Neuro: normal  Endocrine: normal  Psych: normal  Lymph/Heme: normal    OBJECTIVE:  Vitals:    09/07/17 1418   BP: 128/80   Pulse: 70   Resp: 16   Temp: 98.1  F (36.7  C)   SpO2: 96%     General Appearance:    Alert, cooperative, no distress, appears stated age   Head:    Normocephalic, without obvious abnormality, atraumatic   Eyes:   Bilateral cataracts   Nose:   Mucosa moist, normal    Throat:   Lips, mucosa, and tongue normal; ora phaynx clear   Neck:   Supple, symmetrical, trachea midline, no adenopathy;     thyroid:  no enlargement/tenderness/nodules; no carotid    bruit or JVD   Lungs:     Clear to auscultation bilaterally, respirations unlabored    Heart:    Regular rate and rhythm, S1 and S2 normal, no murmur, rub   or gallop   Abdomen:     Soft, non-tender, bowel sounds active all four quadrants,     no masses, no organomegaly   Extremities:   Extremities normal, atraumatic, no cyanosis or edema, he does have the left arm tenderness through the medial epicondyle   Pulses:   2+ and symmetric all extremities   Skin:   Skin color, texture, turgor normal, no rashes or lesions   Neurologic:   No focal deficits         Labs:  No labs or EKG indicated    ASSESSMENT/PLAN:  1. Preop examination     2. Ulnar neuropathy of left upper extremity     3. Cataract       Patient is okay to proceed with cataract surgery    He will follow-up if persisting ulnar neuropathy or left upper area extremity symptoms persist      Dat Francis MD

## 2021-06-13 NOTE — PROGRESS NOTES
Chief Complaint   Patient presents with     Foreign Body     piece of glass in left pointer x one day        History of Present Illness: Nursing notes reviewed.  Patient presents to clinic, showing me on initial exam a small foreign body under the skin of his left index finger.  There was no reported broken glass involved with this, but he was handling some window frames recently.  He has discomfort where the foreign body under the skin is.  He would like it removed if possible.    Review of systems: See history of present illness, otherwise negative.     Current Outpatient Prescriptions   Medication Sig Dispense Refill     ibuprofen (ADVIL,MOTRIN) 200 MG tablet Take 200 mg by mouth every 6 (six) hours as needed for pain.       No current facility-administered medications for this visit.        Past Medical History:   Diagnosis Date     Sprain Of The Left Hip     Created by Conversion       No past surgical history on file.   Social History     Social History     Marital status:      Spouse name: N/A     Number of children: N/A     Years of education: N/A     Social History Main Topics     Smoking status: Former Smoker     Packs/day: 1.00     Types: Cigarettes     Smokeless tobacco: Former User     Quit date: 6/10/2016      Comment: smoking since age 20 years old per pt     Alcohol use Yes      Comment: 3-5 beers per day     Drug use: No     Sexual activity: Not Asked     Other Topics Concern     None     Social History Narrative       History   Smoking Status     Former Smoker     Packs/day: 1.00     Types: Cigarettes   Smokeless Tobacco     Former User     Quit date: 6/10/2016     Comment: smoking since age 20 years old per pt      Exam:   Blood pressure 130/80, pulse 66, temperature 98.6  F (37  C), temperature source Oral, weight 161 lb 14.4 oz (73.4 kg), SpO2 98 %.    EXAM:   General: Vital signs reviewed. Patient is in no acute appearing distress. Breathing is non labored appearing. Patient is alert and  oriented x 3.   Examination of left index finger shows a small brown discoloration of skin in the middle of finger on lateral side.  It has the appearance of a sliver less than 1 mm in length.  This area was prepped with Betadine swab and alcohol wipe.  A #19-gauge needle tip was used to make a superficial incision immediately around the probable sliver.  While flushing the very small amount of blood coming from wound from this site, I was able to extract using a pointed needle hernandez a small foreign body from under skin.  A small amount of bleeding was controlled with direct pressure.    Assessment/Plan   1. Foreign body of left index finger  Tdap vaccine,  8yo or older,  IM       Patient Instructions   Keep antibiotic ointment in the bandage over the wound until the skin is healed over. Be seen again for signs of infection such as increasing redness, drainage, or swelling.     Jayy Bronson, DO

## 2021-06-16 NOTE — PROGRESS NOTES
"    Assessment & Plan     Rip was seen today for medication management.    Diagnoses and all orders for this visit:    Elevated BP without diagnosis of hypertension  -     Comprehensive Metabolic Panel    Erectile dysfunction, unspecified erectile dysfunction type  -     sildenafil (REVATIO) 20 mg tablet; 1-3 BY MOUTH 1 HOUR PRIOR TO SEX AS NEEDED  -     Lipid Cascade FASTING    Adenomatous polyp of colon, unspecified part of colon  -     HM2(CBC w/o Differential)    Former smoker  -     CT Low Dose Lung Screening Chest; Future    Nocturia  -     PSA (Prostatic-Specific Antigen), Diagnostic        Former smoker check low-dose lung screening CT.    Elevated blood pressure monitor as outlined    Nocturia check PSA    Patient be contacted regarding labs    Next colonoscopy for follow-up on adenomatous colon polyps due in 2023    Erectile dysfunction, refill on sildenafil         Return in about 1 year (around 2022) for Annual physical.    Dat Francis MD  [facility]   Rip Crockett is 70 y.o. and presents today for the following health issues   HPI     This patient had a follow-up visit, patient has not been in for quite a while.    Needed follow-up on meds he is on sildenafil for erectile dysfunction.    He had elevated blood pressure here 150/89 and recheck 142/78 he will keep track of blood pressures every couple weeks over the next few months if it remains over 140/90 he will let me know    Patient had a 45-year smoking history 1 pack/day he is quit in the last 5 to 6 years    We discussed screening for lung cancer with low-dose chest CT and he was in favor of doing this order was placed.    We also reviewed labs he has had some nocturia 1-2 times a night we will check PSA diagnostic    Also CMP CBC and lipid.    Patient and family history his dad  of lung cancer with brain metastases in his early 70s.  His mom  in her mid 80s from \"old age.    Patient " otherwise is doing well his elbow and wrist are okay minimal carpal tunnel symptoms now he had an EMG in June I reviewed that    He thinks he has had his shingles shots in both pneumonia shots he will check at the pharmacy where he gets his vaccinations.    Patient had a colon polyp tubular adenoma in July 2018 we will recheck July 2023.    Otherwise has been healthy no additional concern or issue.    Review of Systems  10 point review of systems positive as outlined above otherwise negative      Objective    /78 (Patient Site: Left Arm, Patient Position: Sitting, Cuff Size: Adult Regular)   Pulse 70   Temp 97.7  F (36.5  C)   Resp 12   Wt 159 lb 1.9 oz (72.2 kg)   BMI 25.49 kg/m    Body mass index is 25.49 kg/m .  Physical Exam  General appearance no acute distress    HEENT canals and TMs normal oropharynx clear pupils react normally    Lungs clear no rales or rhonchi heart regular S1-S2 no murmur    Abdomen nontender    Skin is normal no rash    Genitalia normal descended testes no hernia    Rectal was done prostate not large.    Lower extremities without edema pulses are normal no atrophic changes through the skin    Labs include PSA CMP CBC and lipid    Order for low-dose lung CT

## 2021-06-16 NOTE — TELEPHONE ENCOUNTER
Please contact this patient, he needs a follow-up visit with me, please schedule face-to-face visit for April or May.    He has not been seen since 2018    Let them know I did refill 10 tablets of the sildenafil

## 2021-06-16 NOTE — TELEPHONE ENCOUNTER
RN cannot approve Refill Request    RN can NOT refill this medication PCP messaged that patient is overdue for Office Visit. Last office visit: 2018 Dat Francis MD Last Physical: 2018 Last MTM visit: Visit date not found Last visit same specialty: 2018 Dat Francis MD.  Next visit within 3 mo: Visit date not found  Next physical within 3 mo: Visit date not found      Tasha Keller, [facility] Triage/Med Refill 3/22/2021    Requested Prescriptions   Pending Prescriptions Disp Refills     sildenafil (REVATIO) 20 mg tablet [Pharmacy Med Name: Sildenafil Citrate Oral Tablet 20 MG] 30 tablet 0     Si-3 BY MOUTH 1 HOUR PRIOR TO SEX AS NEEDED       Medications for Impotence Refill Protocol Failed - 3/22/2021  4:58 PM        Failed - PCP or prescribing provider visit in last year     Last office visit with prescriber/PCP: 2018 Dat Francis MD OR same dept: Visit date not found OR same specialty: 2018 Dat Francis MD  Last physical: 2018 Last MTM visit: Visit date not found   Next visit within 3 mo: Visit date not found  Next physical within 3 mo: Visit date not found  Prescriber OR PCP: Dat Francis MD  Last diagnosis associated with med order: 1. Erectile dysfunction, unspecified erectile dysfunction type  - sildenafil (REVATIO) 20 mg tablet [Pharmacy Med Name: Sildenafil Citrate Oral Tablet 20 MG]; 2-3 BY MOUTH 1 HOUR PRIOR TO SEX AS NEEDED  Dispense: 30 tablet; Refill: 0    If protocol passes may refill for 12 months if within 3 months of last provider visit (or a total of 15 months).

## 2021-06-17 NOTE — PATIENT INSTRUCTIONS - HE
Patient Instructions by Rick Saini MD at 11/13/2019  8:50 AM     Author: Rick Saini MD Service: -- Author Type: Physician    Filed: 11/13/2019  9:25 AM Encounter Date: 11/13/2019 Status: Addendum    : Rick Saini MD (Physician)    Related Notes: Original Note by Rick Saini MD (Physician) filed at 11/13/2019  9:24 AM       -Your wound is too old to be closed with sutures.  -Your wound was reinforced with a Steri-Strip today.  This should stay in place for the next several days.  -Monitor closely for signs/symptoms of infection and return as needed.  Patient Education     Old Laceration: Not Sutured  A laceration is a cut through the skin. This will usually require stitches if it is deep. However, if a laceration remains open for too long, the risk of infection increases. In your case, too much time has passed before coming for treatment. The danger of infection from stitching at this time is too high. That is why your wound was not stitched.  If the wound is spread open, it will heal by filling in from the bottom and sides. A wound that is not stitched may take 1 to 4 weeks to heal, depending on the size of the opening. You will probably have a visible scar. You can discuss revision of the scar with your healthcare provider at a later time.  Home care  The following guidelines will help you care for your laceration at home:    Keep the wound clean and dry. If a bandage was applied and it becomes wet or dirty, replace it. Otherwise, leave it in place for the first 24 hours, then change it once a day or as directed.    The healthcare provider may prescribe an antibiotic cream or ointment to prevent infection.     If you are at high risk for infection, or the wound is very dirty, your provider may prescribe an oral antibiotic medicine to prevent infection. Don't stop using this medicine until you have finished it all, or the provider tells you to stop.    The healthcare provider  may also prescribe medicine for pain. Follow instructions for taking these medicines.  Clean the wound daily:    After removing any bandage, wash the area with soap and water. Use a wet cotton swab to loosen and remove any blood or crust that forms.    Talk with your healthcare provider before applying any antibiotic ointment to the wound. Reapply a fresh bandage.    You may remove the bandage to shower as usual after the first 24 hours, but don't soak the area in water. This means no tub baths or swimming until the wound heals or your provider tells you it's OK.  Don't do activities that may reinjure your wound.    Check the wound daily for signs of infection listed below.  Follow-up care  Follow up with your healthcare provider, or as advised.  When to seek medical advice  Call your healthcare provider right away if any of these occur:    Wound bleeding not controlled by direct pressure    Signs of infection, including increasing pain in the wound, increasing wound redness or swelling, or pus or bad odor coming from the wound    Fever of 100.4 F (38. C) or higher, or as directed by your healthcare provider    Wound edges reopen    Wound changes colors    Numbness around the wound     Decreased movement around the injured area  Date Last Reviewed: 7/1/2017 2000-2017 [employer]. [address] [place] [address], [place] [address]. All rights reserved. This information is not intended as a substitute for professional medical care. Always follow your healthcare professional's instructions.

## 2021-06-17 NOTE — TELEPHONE ENCOUNTER
Left message x 1. Please review message thread below and advise the patient as indicated. Please schedule if necessary or indicated in message thread.    ----- Message from Dat Francis MD sent at 5/18/2021  8:59 AM CDT -----  Please contact this patient, let him know that the scan for lung cancer came back negative no concerning findings.    Recommendation is to get this done yearly

## 2021-06-17 NOTE — PROGRESS NOTES
Subjective: Patient comes in for a couple things.  He has had pain in his elbow for a couple months it hurts along the medial epicondyle hurts when he strains the volar forearm flexor muscles.    He has not changed anything regarding his activities, he stays pretty active.    He does take ibuprofen but just a small amount.    Second issue is his smoking he had talked to a brother about getting a low-dose screening CT scan.  I discussed the recommendations regarding this he has fits all the criteria smokes 1 pack per day quit 2 years ago smoked for 50 years.    He definitely wants to do something if they find something on the scan, willing to do this yearly.    Additionally we discussed healthcare maintenance and he will fill out Cologuard  Tobacco status: He  reports that he has quit smoking. His smoking use included Cigarettes. He smoked 1.00 pack per day. He quit smokeless tobacco use about 22 months ago.    Patient Active Problem List    Diagnosis Date Noted     Nicotine Dependence        Current Outpatient Prescriptions   Medication Sig Dispense Refill     ibuprofen (ADVIL,MOTRIN) 200 MG tablet Take 200 mg by mouth every 6 (six) hours as needed for pain.       No current facility-administered medications for this visit.        ROS:   Review of systems negative other than as outlined above    Objective:    /72 (Patient Site: Right Arm, Patient Position: Sitting, Cuff Size: Adult Large)  Pulse 70  Resp 12  Wt 160 lb (72.6 kg)  SpO2 96%  BMI 24.51 kg/m2  Body mass index is 24.51 kg/(m^2).      General appearance no acute distress    Lungs are clear    Heart was regular    Left elbow with tenderness at the medial epicondyle and pain when stressing volar flexor muscles.    No swelling        Assessment:  1. Medial epicondylitis of elbow, left     2. Nicotine Dependence     3. Health care maintenance  Cologuard   4. Emphysema of lung  CANCELED: CT Chest Without Contrast    From chest x-ray   5. Tobacco use   CT Low Dose Lung Screening Chest    CANCELED: CT Low Dose Lung Screening Chest    CANCELED: CT Chest Without Contrast    CANCELED: CT Low Dose Lung Screening Chest     Cologuard, CT low-dose screening    Ice rest, increase ibuprofen to 200 mg 3 tablets 3 times daily for the next couple weeks follow-up if persistent problems consider injection of steroid for the medial epicondylitis    Plan: As outlined above    This transcription uses voice recognition software, which may contain typographical errors.

## 2021-06-18 NOTE — PROGRESS NOTES
"Subjective: Patient comes in for evaluation a 68-year-old male who has had some problems with his ulnar nerve again    Left upper extremity he has intermittent \"zinger type electric shots\" from his left hand up towards his axilla.  Denies neck problems or issues    He was evaluated in the past and had an MRI of the cervical spine a year or 2 ago.  It did not show any significant problem    Patient had a Medrol pack in August 2017 which was effective he wondered if he could get that again    We discussed possibly getting an EMG he states he would do that if this does not help    He gets symptoms 20-30 times a day he states    I reviewed his positive Cologard, have you on for colonoscopy.  Also discussed the CT low-dose lung cancer screening negative finding.  Recheck that in a year.    Tobacco status: He  reports that he has quit smoking. His smoking use included Cigarettes. He smoked 1.00 pack per day. He quit smokeless tobacco use about 1 years ago.    Patient Active Problem List    Diagnosis Date Noted     Nicotine Dependence        Current Outpatient Prescriptions   Medication Sig Dispense Refill     ibuprofen (ADVIL,MOTRIN) 200 MG tablet Take 200 mg by mouth every 6 (six) hours as needed for pain.       methylPREDNISolone (MEDROL DOSEPACK) 4 mg tablet follow package directions 21 tablet 0     No current facility-administered medications for this visit.        ROS: View of systems negative other than as outlined above    Objective:    /84 (Patient Site: Left Arm, Patient Position: Sitting, Cuff Size: Adult Regular)  Pulse 65  Temp 97.5  F (36.4  C) (Oral)   Resp 16  Wt 157 lb (71.2 kg)  SpO2 96% Comment: at rest with room air  BMI 24.05 kg/m2  Body mass index is 24.05 kg/(m^2).      General appearance no acute distress    Some tenderness through the medial epicondyle and ulnar groove but not significant    Denies any real wrist tenderness.  No axillary adenopathy    Neck was supple no tenderness in " through the paraspinal muscles or neck muscles.        Assessment:  1. Ulnar neuropathy of left upper extremity  methylPREDNISolone (MEDROL DOSEPACK) 4 mg tablet     We will try Medrol pack if not improved check EMG left upper extremity    Await colonoscopy.    Plan: As discussed above    This transcription uses voice recognition software, which may contain typographical errors.

## 2021-06-19 NOTE — PROGRESS NOTES
Patient presents at the request of Dr. Dat Francis for left upper extremity EMG.  He has chronic left elbow pain at the medial epicondyle was shooting pain up to the axilla and down into the hand.  Worsening. This is been present for several years . Typically all fingers can be involved.  He is right-handed.  Worse with use of his arm.  No muscle atrophy on exam.  No specific Tinel's at the elbow or obvious ulnar nerve subluxation.    EMG/NCS  results: Please see scanned full report    Comment NCS: Abnormal study  1.  Mildly prolonged left ulnar SNAP latency  2.  Prolonged left dorsal ulnar cutaneous latency compared to right.  3.  Normal left median and radial studies.    Comment EMG: Normal study  1.  Normal needle EMG left upper extremity.    Interpretation: Abnormal study    1.  Mild left ulnar sensory neuropathy.  This localizes proximal to the takeoff of the dorsal ulnar cutaneous.  Difficult to further localize.    2. There is no electrodiagnostic evidence of cervical radiculopathy, brachial plexopathy, or median neuropathy in the left upper extremity.    The testing was completed in its entirety by the physician.       It was our pleasure caring for your patient today, if there any questions or concerns please do not hesitate to contact us.

## 2021-06-19 NOTE — PROGRESS NOTES
Patient was contacted, he still having ongoing symptoms.  Please see my last office visit from 7/24/2018    Patient wants to go ahead and see the orthopedic surgeon regarding this

## 2021-06-19 NOTE — PROGRESS NOTES
Subjective: Patient comes in for follow-up.  He was seen back on 5/22/2018 he had some ulnar nerve symptoms on the left.  I treated him with Medrol which would help to couple years prior.    He had some possible radicular component to this and had an MRI a couple years ago the cervical spine and that was okay.    Medrol this time did not seem to help much    On exam he does have a little discomfort through the medial epicondyle area symptoms of some numbness into the fourth and fifth finger but not little through the third finger.    He does not have any neck tenderness.    We discussed options elected to get an EMG please see below    Patient had a colonoscopy showed adenomatous colon polyp he will be due again in 5 years for colonoscopy.    No additional symptoms or problems    Tobacco status: He  reports that he has quit smoking. His smoking use included Cigarettes. He smoked 1.00 pack per day. He quit smokeless tobacco use about 2 years ago.    Patient Active Problem List    Diagnosis Date Noted     Adenomatous colon polyp 07/16/2018     Nicotine Dependence        Current Outpatient Prescriptions   Medication Sig Dispense Refill     ibuprofen (ADVIL,MOTRIN) 200 MG tablet Take 200 mg by mouth every 6 (six) hours as needed for pain.       methylPREDNISolone (MEDROL DOSEPACK) 4 mg tablet follow package directions 21 tablet 0     No current facility-administered medications for this visit.        ROS:   Review of symptoms positive as outlined above    Objective:    /80 (Patient Site: Right Arm, Patient Position: Sitting, Cuff Size: Adult Regular)  Pulse 80  Resp 24  Wt 152 lb (68.9 kg)  BMI 23.28 kg/m2  Body mass index is 23.28 kg/(m^2).      General appearance no acute distress vital signs are stable    Neck without tenderness no axillary adenopathy    Good range of motion through the shoulder    No atrophic changes through the hand normal pulses    Skin was normal    Complains of numbness in through the  fourth and fifth fingers and slightly on the third negative Tinel's negative Phalen    Tenderness through the medial epicondyle/ulnar groove        Assessment:  1. Ulnar neuropathy of left upper extremity  Ambulatory referral to [facility]   2. Adenomatous colon polyp       Check EMG, contact patient with results    Adenomatous colon polyp recheck colonoscopy in 5 years    Plan: As outlined above    This transcription uses voice recognition software, which may contain typographical errors.

## 2021-06-20 NOTE — PROGRESS NOTES
Preoperative Exam    Scheduled Procedure: left ulnar nerve decompression (LCUTR)  Surgery Date:  9/27/2018  Surgery Location: [facility] fax [phone] # [phone]    Surgeon:  Luke Guan MD    Assessment/Plan:     1. Preop general physical exam  HM2(CBC w/o Differential)    Electrocardiogram Perform - Clinic    XR Chest 2 Views   2. Ulnar neuropathy of left upper extremity     3. Nicotine Dependence  XR Chest 2 Views         Surgical Procedure Risk: Low (reported cardiac risk generally < 1%)  Have you had prior anesthesia?: Yes  Have you or any family members had a previous anesthesia reaction:  No  Do you or any family members have a history of a clotting or bleeding disorder?: No  Cardiac Risk Assessment: no increased risk for major cardiac complications    Patient approved for surgery with general or local anesthesia.        Functional Status: Independent  Patient plans to recover at home with family.     Subjective:      Rip Crockett is a 68 y.o. male who presents for a preoperative consultation.  This patient's had some persistent intermittent left ulnar distribution pain and burning.    He had evaluation from hand surgery and had positive EMG findings.    Plan for ulnar nerve decompression at the elbow.    He denies any weakness, denies any rash.    Has a history of tobacco use but now has quit.    10 point review of systems reviewed and are negative, other than those listed in the HPI.    Pertinent History  Do you have difficulty breathing or chest pain after walking up a flight of stairs: No  History of obstructive sleep apnea: No  Steroid use in the last 6 months: Yes: cortisone injection in Lt elbow   Frequent Aspirin/NSAID use: No  Prior Blood Transfusion: No  Prior Blood Transfusion Reaction: No  If for some reason prior to, during or after the procedure, if it is medically indicated, would you be willing to have a blood transfusion?:  There is no transfusion refusal.    Current  "Outpatient Prescriptions   Medication Sig Dispense Refill     ibuprofen (ADVIL,MOTRIN) 200 MG tablet Take 200 mg by mouth every 6 (six) hours as needed for pain.       No current facility-administered medications for this visit.         Allergies   Allergen Reactions     Penicillins      Cephalosporins Rash     Keflex [Cephalexin] Hives and Rash       Patient Active Problem List   Diagnosis     Nicotine Dependence     Adenomatous colon polyp       Past Medical History:   Diagnosis Date     Sprain Of The Left Hip     Created by Conversion    Cervical radiculopathy    Left ulnar neuropathy    Past surgical history: Vasectomy, bilateral cataracts, abdominal hernia.    No family or personal history for anesthesia or bleeding problems.    Social History     Social History     Marital status:      Spouse name: N/A     Number of children: N/A     Years of education: N/A     Occupational History     Not on file.     Social History Main Topics     Smoking status: Former Smoker     Packs/day: 1.00     Types: Cigarettes     Smokeless tobacco: Former User     Quit date: 6/10/2016      Comment: smoking since age 20 years old per pt     Alcohol use Yes      Comment: 3-5 beers per day     Drug use: No     Sexual activity: Not on file     Other Topics Concern     Not on file     Social History Narrative             Objective:     Vitals:    09/19/18 1034   BP: 120/60   Pulse: 70   Temp: 98.5  F (36.9  C)   TempSrc: Oral   SpO2: 98%   Weight: 151 lb (68.5 kg)   Height: 5' 6.25\" (1.683 m)         Physical Exam:  General appearance no acute distress    HEENT neck is negative oropharynx is clear pupils react normally extract movements full    Lungs are clear no rales or rhonchi, heart was regular S1-S2.  No murmur    O2 sat 98%    Abdomen soft nontender no guarding or rebound extremities without edema.  Skin was normal    Patient has ulnar distribution intermittent burning and pain left.    No edema.      Chest x-ray, lungs " clear, heart size normal    EKG: EKG reviewed and agree with reading below    Labs:  Recent Results (from the past 24 hour(s))   HM2(CBC w/o Differential)    Collection Time: 09/19/18 11:00 AM   Result Value Ref Range    WBC 4.7 4.0 - 11.0 thou/uL    RBC 4.18 (L) 4.40 - 6.20 mill/uL    Hemoglobin 13.3 (L) 14.0 - 18.0 g/dL    Hematocrit 40.0 40.0 - 54.0 %    MCV 96 80 - 100 fL    MCH 31.8 27.0 - 34.0 pg    MCHC 33.2 32.0 - 36.0 g/dL    RDW 10.8 (L) 11.0 - 14.5 %    Platelets 291 140 - 440 thou/uL    MPV 6.5 (L) 7.0 - 10.0 fL   Electrocardiogram Perform - Clinic    Collection Time: 09/19/18 11:19 AM   Result Value Ref Range    SYSTOLIC BLOOD PRESSURE  mmHg    DIASTOLIC BLOOD PRESSURE  mmHg    VENTRICULAR RATE 66 BPM    ATRIAL RATE 66 BPM    P-R INTERVAL 118 ms    QRS DURATION 94 ms    Q-T INTERVAL 386 ms    QTC CALCULATION (BEZET) 404 ms    P Axis 36 degrees    R AXIS 14 degrees    T AXIS 42 degrees    MUSE DIAGNOSIS       Normal sinus rhythm  Normal ECG  No previous ECGs available         Immunization History   Administered Date(s) Administered     DT (pediatric) 09/06/2000     Influenza high dose, seasonal 11/03/2017     Influenza, inj, historic,unspecified 10/11/2016     Pneumo Conj 13-V (2010&after) 09/03/2017     Td,adult,historic,unspecified 09/06/2000     Tdap 09/08/2010, 10/11/2017     ZOSTER, LIVE 05/19/2016           Electronically signed by Dat Francis MD 09/19/18 10:36 AM

## 2021-06-21 NOTE — LETTER
Letter by Dat Francis MD at      Author: Dat Francis MD Service: -- Author Type: --    Filed:  Encounter Date: 4/23/2021 Status: (Other)         Rip Crockett  [address] [place] [address]             April 23, 2021         Dear Mr. Crockett,    Below are the results from your recent visit:    Resulted Orders   PSA (Prostatic-Specific Antigen), Diagnostic   Result Value Ref Range    PSA 0.6 0.0 - 4.5 ng/mL    Narrative    Method is Abbott Prostate-Specific Antigen (PSA)  Standard-WHO 1st International (90:10)   Comprehensive Metabolic Panel   Result Value Ref Range    Sodium 135 (L) 136 - 145 mmol/L    Potassium 4.6 3.5 - 5.0 mmol/L    Chloride 100 98 - 107 mmol/L    CO2 25 22 - 31 mmol/L    Anion Gap, Calculation 10 5 - 18 mmol/L    Glucose 97 70 - 125 mg/dL    BUN 13 8 - 22 mg/dL    Creatinine 0.78 0.70 - 1.30 mg/dL    GFR MDRD Af Amer >60 >60 mL/min/1.73m2    GFR MDRD Non Af Amer >60 >60 mL/min/1.73m2    Bilirubin, Total 0.6 0.0 - 1.0 mg/dL    Calcium 9.1 8.5 - 10.5 mg/dL    Protein, Total 6.8 6.0 - 8.0 g/dL    Albumin 4.1 3.5 - 5.0 g/dL    Alkaline Phosphatase 54 45 - 120 U/L    AST 24 0 - 40 U/L    ALT 21 0 - 45 U/L    Narrative    Fasting Glucose reference range is 70-99 mg/dL per  American Diabetes Association (ADA) guidelines.   Lipid [place] FASTING   Result Value Ref Range    Cholesterol 192 <=199 mg/dL    Triglycerides 53 <=149 mg/dL    HDL Cholesterol 84 >=40 mg/dL    LDL Calculated 97 <=129 mg/dL    Patient Fasting > 8hrs? No    HM2(CBC w/o Differential)   Result Value Ref Range    WBC 5.4 4.0 - 11.0 thou/uL    RBC 4.36 (L) 4.40 - 6.20 mill/uL    Hemoglobin 14.0 14.0 - 18.0 g/dL    Hematocrit 42.6 40.0 - 54.0 %    MCV 98 80 - 100 fL    MCH 32.1 27.0 - 34.0 pg    MCHC 32.9 32.0 - 36.0 g/dL    RDW 12.0 11.0 - 14.5 %    Platelets 255 140 - 440 thou/uL    MPV 8.8 7.0 - 10.0 fL        Your labs all look good.      PSA is still nice and low at 0.6 regarding prostate.      The liver kidney  electrolytes and blood sugar were all good.    Cholesterol levels were also good, the good cholesterol,, HDL, was 84 and the bad cholesterol LDL was 97.  Both  in a good range.      The hemoglobin and white count were also normal       I will contact you when the CT scan of your lung comes back as well.     Please call with questions or contact us using MyChart.    Sincerely,        Electronically signed by Dat Francis MD

## 2021-06-23 NOTE — TELEPHONE ENCOUNTER
Viagra is now generic.  Sildenafil is the name of the generic.    He probably was on 50 mg of Viagra in the past.    The cheapest way to get this is to take the generic sildenafil 20 mg and take 2-3 tablets 1 hour prior to sex (that would be 40-60 mg).    I sent a prescription for 30 tablets with refills of the sildenafil 20 mg, to the Pharmacy

## 2021-06-23 NOTE — TELEPHONE ENCOUNTER
Medication Request  Medication name: Viagra   Pharmacy Name and Location: [facility] on [address]   Reason for request: I have not refilled this in several years and I need this.  When did you use medication last?:  5-6 years ago  Okay to leave a detailed message: yes

## 2021-06-25 NOTE — TELEPHONE ENCOUNTER
Please let patient know that I think he needs to go on a blood pressure medicine.    I sent a prescription for losartan 50 mg 1 a day.    Continue to keep track of blood pressures.    He will need to follow-up at the clinic and we need to recheck on blood pressure and check potassium and creatinine in about 4 to 6 weeks

## 2021-08-16 ENCOUNTER — OFFICE VISIT (OUTPATIENT)
Dept: FAMILY MEDICINE | Facility: CLINIC | Age: 71
End: 2021-08-16
Payer: MEDICARE

## 2021-08-16 VITALS
HEIGHT: 66 IN | SYSTOLIC BLOOD PRESSURE: 128 MMHG | WEIGHT: 150 LBS | HEART RATE: 80 BPM | BODY MASS INDEX: 24.11 KG/M2 | DIASTOLIC BLOOD PRESSURE: 68 MMHG

## 2021-08-16 DIAGNOSIS — R03.0 ELEVATED BP WITHOUT DIAGNOSIS OF HYPERTENSION: ICD-10-CM

## 2021-08-16 DIAGNOSIS — J20.9 ACUTE BRONCHITIS, UNSPECIFIED ORGANISM: Primary | ICD-10-CM

## 2021-08-16 DIAGNOSIS — J43.9 PULMONARY EMPHYSEMA, UNSPECIFIED EMPHYSEMA TYPE (H): ICD-10-CM

## 2021-08-16 DIAGNOSIS — I10 BENIGN ESSENTIAL HYPERTENSION: ICD-10-CM

## 2021-08-16 DIAGNOSIS — Z87.891 FORMER SMOKER: ICD-10-CM

## 2021-08-16 DIAGNOSIS — G56.22 NEURITIS OF LEFT ULNAR NERVE: ICD-10-CM

## 2021-08-16 LAB
CREAT SERPL-MCNC: 0.79 MG/DL (ref 0.7–1.3)
GFR SERPL CREATININE-BSD FRML MDRD: >90 ML/MIN/1.73M2
POTASSIUM BLD-SCNC: 4.6 MMOL/L (ref 3.5–5)

## 2021-08-16 PROCEDURE — 82565 ASSAY OF CREATININE: CPT | Performed by: FAMILY MEDICINE

## 2021-08-16 PROCEDURE — 84132 ASSAY OF SERUM POTASSIUM: CPT | Performed by: FAMILY MEDICINE

## 2021-08-16 PROCEDURE — 99214 OFFICE O/P EST MOD 30 MIN: CPT | Performed by: FAMILY MEDICINE

## 2021-08-16 PROCEDURE — 36415 COLL VENOUS BLD VENIPUNCTURE: CPT | Performed by: FAMILY MEDICINE

## 2021-08-16 RX ORDER — LOSARTAN POTASSIUM 50 MG/1
50 TABLET ORAL DAILY
Qty: 30 TABLET | Refills: 5 | Status: SHIPPED | OUTPATIENT
Start: 2021-08-16 | End: 2022-02-09

## 2021-08-16 RX ORDER — AZITHROMYCIN 250 MG/1
TABLET, FILM COATED ORAL
Qty: 6 TABLET | Refills: 0 | Status: SHIPPED | OUTPATIENT
Start: 2021-08-16 | End: 2021-08-21

## 2021-08-16 RX ORDER — PREDNISONE 20 MG/1
TABLET ORAL
Qty: 15 TABLET | Refills: 0 | Status: SHIPPED | OUTPATIENT
Start: 2021-08-16 | End: 2022-02-14

## 2021-08-16 ASSESSMENT — MIFFLIN-ST. JEOR: SCORE: 1388.15

## 2021-08-16 NOTE — PROGRESS NOTES
Assessment & Plan        ICD-10-CM    1. Acute bronchitis, unspecified organism  J20.9 azithromycin (ZITHROMAX) 250 MG tablet     predniSONE (DELTASONE) 20 MG tablet   2. Former smoker  Z87.891    3. Pulmonary emphysema, unspecified emphysema type (H)  J43.9     on CT lung  5/2021    4. Benign essential hypertension  I10 Potassium     Creatinine   5. Elevated BP without diagnosis of hypertension  R03.0 losartan (COZAAR) 50 MG tablet   6. Neuritis of left ulnar nerve  G56.22 predniSONE (DELTASONE) 20 MG tablet          Acute bronchitis, history of emphysema, will treat with azithromycin and prednisone.    The prednisone will be 20 mg 2 a day for 5 days and 1 a day for 5 days.    This should help with his ulnar neuritis, await orthopedic follow-up there    Hypertension now on losartan 50 mg a day, check potassium and creatinine.  This will be contacted if needed otherwise follow-up in 6 months      Return in about 6 months (around 2/16/2022) for Follow up. for recheck.        Subjective:    This 71 year old male was seen today for evaluation regarding his cough.  He started have a cough a couple weeks ago coughed up some tan phlegm no blood    He has a history of pulmonary emphysema noted on a CT scan on 5/2021 for lung cancer screening    Patient was a long-term smoker quit about 8 to 10 years ago.    Patient also had elevated blood pressure previously, was treated with losartan 50 mg a day blood pressure looks to be under control we will check potassium and creatinine denies any side effects.    He is also had some problems again with his left elbow he said surgery in the past he has pain in through the elbow and down the ulnar distribution.  He does follow-up with orthopedist.    Patient denies any problems with smell or taste he has had his Covid vaccinations    No shortness of breath no fever.    Review of Systems    10 point review of systems positive as outlined above otherwise negative    Review of CT scan  "from 5/18/2021.        Current Outpatient Medications   Medication     azithromycin (ZITHROMAX) 250 MG tablet     ibuprofen (ADVIL,MOTRIN) 200 MG tablet     losartan (COZAAR) 50 MG tablet     predniSONE (DELTASONE) 20 MG tablet     sildenafil (REVATIO) 20 mg tablet     No current facility-administered medications for this visit.       Objective    Vitals: /68   Pulse 80   Ht 1.676 m (5' 6\")   Wt 68 kg (150 lb)   BMI 24.21 kg/m    BMI= Body mass index is 24.21 kg/m .     Physical Exam    General appearance no acute distress vital signs were stable blood pressure looks good    HEENT no JVD oropharynx was clear nose with no drainage    Canals TMs normal    Lungs with some coarse breath sounds cleared with coughing.    Heart regular S1-S2 rate at 80,    Abdomen nontender    Extremities without edema.        Dat Francis MD   "

## 2021-08-23 ENCOUNTER — TRANSFERRED RECORDS (OUTPATIENT)
Dept: HEALTH INFORMATION MANAGEMENT | Facility: CLINIC | Age: 71
End: 2021-08-23

## 2021-10-10 ENCOUNTER — HEALTH MAINTENANCE LETTER (OUTPATIENT)
Age: 71
End: 2021-10-10

## 2021-12-14 ENCOUNTER — TELEPHONE (OUTPATIENT)
Dept: FAMILY MEDICINE | Facility: CLINIC | Age: 71
End: 2021-12-14
Payer: MEDICARE

## 2021-12-14 DIAGNOSIS — N52.9 ERECTILE DYSFUNCTION, UNSPECIFIED ERECTILE DYSFUNCTION TYPE: ICD-10-CM

## 2021-12-14 RX ORDER — SILDENAFIL CITRATE 20 MG/1
TABLET ORAL
Qty: 40 TABLET | Refills: 5 | Status: SHIPPED | OUTPATIENT
Start: 2021-12-14 | End: 2023-04-08

## 2021-12-14 NOTE — TELEPHONE ENCOUNTER
Reason for Call:  Medication or medication refill:    Do you use a [facility]?  Name of the pharmacy and phone number for the current request:  [facility] [address]    Name of the medication requested: sildenifil    Other request:     Can we leave a detailed message on this number? Not Applicable    Phone number patient can be reached at: Home number on file [phone] (home)    Best Time: asap please    Call taken on 12/14/2021 at 1:03 PM by Karen Flowers

## 2022-01-30 ENCOUNTER — HEALTH MAINTENANCE LETTER (OUTPATIENT)
Age: 72
End: 2022-01-30

## 2022-02-07 DIAGNOSIS — R03.0 ELEVATED BP WITHOUT DIAGNOSIS OF HYPERTENSION: ICD-10-CM

## 2022-02-09 RX ORDER — LOSARTAN POTASSIUM 50 MG/1
50 TABLET ORAL DAILY
Qty: 90 TABLET | Refills: 1 | Status: SHIPPED | OUTPATIENT
Start: 2022-02-09 | End: 2022-08-05

## 2022-02-09 NOTE — TELEPHONE ENCOUNTER
"    Last Written Prescription Date:  8/16/21  Last Fill Quantity: 30,  # refills: 5   Last office visit provider:  8/16/21    Requested Prescriptions   Pending Prescriptions Disp Refills     losartan (COZAAR) 50 MG tablet 30 tablet 5     Sig: Take 1 tablet (50 mg) by mouth daily       Angiotensin-II Receptors Passed - 2/7/2022 12:53 PM        Passed - Last blood pressure under 140/90 in past 12 months     BP Readings from Last 3 Encounters:   08/16/21 128/68   04/20/21 (!) 142/78   06/17/20 138/83                 Passed - Recent (12 mo) or future (30 days) visit within the authorizing provider's specialty     Patient has had an office visit with the authorizing provider or a provider within the authorizing providers department within the previous 12 mos or has a future within next 30 days. See \"Patient Info\" tab in inbasket, or \"Choose Columns\" in Meds & Orders section of the refill encounter.              Passed - Medication is active on med list        Passed - Patient is age 18 or older        Passed - Normal serum creatinine on file in past 12 months     Recent Labs   Lab Test 08/16/21  1153   CR 0.79       Ok to refill medication if creatinine is low          Passed - Normal serum potassium on file in past 12 months     Recent Labs   Lab Test 08/16/21  1153   POTASSIUM 4.6                         Loan Bar RN 02/09/22 8:55 AM  "

## 2022-02-14 ENCOUNTER — OFFICE VISIT (OUTPATIENT)
Dept: FAMILY MEDICINE | Facility: CLINIC | Age: 72
End: 2022-02-14
Payer: COMMERCIAL

## 2022-02-14 VITALS
DIASTOLIC BLOOD PRESSURE: 72 MMHG | RESPIRATION RATE: 20 BRPM | HEART RATE: 67 BPM | SYSTOLIC BLOOD PRESSURE: 120 MMHG | OXYGEN SATURATION: 98 % | BODY MASS INDEX: 25.02 KG/M2 | WEIGHT: 155 LBS

## 2022-02-14 DIAGNOSIS — D12.6 ADENOMATOUS POLYP OF COLON, UNSPECIFIED PART OF COLON: ICD-10-CM

## 2022-02-14 DIAGNOSIS — R35.1 NOCTURIA: ICD-10-CM

## 2022-02-14 DIAGNOSIS — Z87.891 FORMER SMOKER: ICD-10-CM

## 2022-02-14 DIAGNOSIS — J43.9 PULMONARY EMPHYSEMA, UNSPECIFIED EMPHYSEMA TYPE (H): ICD-10-CM

## 2022-02-14 DIAGNOSIS — Z11.59 ENCOUNTER FOR HEPATITIS C SCREENING TEST FOR LOW RISK PATIENT: ICD-10-CM

## 2022-02-14 DIAGNOSIS — Z00.00 ENCOUNTER FOR MEDICARE ANNUAL WELLNESS EXAM: Primary | ICD-10-CM

## 2022-02-14 DIAGNOSIS — E55.9 VITAMIN D DEFICIENCY: ICD-10-CM

## 2022-02-14 DIAGNOSIS — I10 BENIGN ESSENTIAL HYPERTENSION: ICD-10-CM

## 2022-02-14 LAB
ALBUMIN SERPL-MCNC: 4.3 G/DL (ref 3.5–5)
ALBUMIN UR-MCNC: NEGATIVE MG/DL
ALP SERPL-CCNC: 44 U/L (ref 45–120)
ALT SERPL W P-5'-P-CCNC: 20 U/L (ref 0–45)
ANION GAP SERPL CALCULATED.3IONS-SCNC: 11 MMOL/L (ref 5–18)
APPEARANCE UR: CLEAR
AST SERPL W P-5'-P-CCNC: 22 U/L (ref 0–40)
BACTERIA #/AREA URNS HPF: ABNORMAL /HPF
BILIRUB SERPL-MCNC: 0.8 MG/DL (ref 0–1)
BILIRUB UR QL STRIP: NEGATIVE
BUN SERPL-MCNC: 10 MG/DL (ref 8–22)
CALCIUM SERPL-MCNC: 9.3 MG/DL (ref 8.5–10.5)
CHLORIDE BLD-SCNC: 98 MMOL/L (ref 98–107)
CHOLEST SERPL-MCNC: 196 MG/DL
CO2 SERPL-SCNC: 23 MMOL/L (ref 22–31)
COLOR UR AUTO: YELLOW
CREAT SERPL-MCNC: 0.78 MG/DL (ref 0.7–1.3)
ERYTHROCYTE [DISTWIDTH] IN BLOOD BY AUTOMATED COUNT: 12.1 % (ref 10–15)
FASTING STATUS PATIENT QL REPORTED: YES
GFR SERPL CREATININE-BSD FRML MDRD: >90 ML/MIN/1.73M2
GLUCOSE BLD-MCNC: 93 MG/DL (ref 70–125)
GLUCOSE UR STRIP-MCNC: NEGATIVE MG/DL
HCT VFR BLD AUTO: 40.2 % (ref 40–53)
HCV AB SERPL QL IA: NEGATIVE
HDLC SERPL-MCNC: 78 MG/DL
HGB BLD-MCNC: 13.2 G/DL (ref 13.3–17.7)
HGB UR QL STRIP: NEGATIVE
KETONES UR STRIP-MCNC: NEGATIVE MG/DL
LDLC SERPL CALC-MCNC: 107 MG/DL
LEUKOCYTE ESTERASE UR QL STRIP: NEGATIVE
MCH RBC QN AUTO: 31.4 PG (ref 26.5–33)
MCHC RBC AUTO-ENTMCNC: 32.8 G/DL (ref 31.5–36.5)
MCV RBC AUTO: 96 FL (ref 78–100)
NITRATE UR QL: NEGATIVE
PH UR STRIP: 7 [PH] (ref 5–8)
PLATELET # BLD AUTO: 254 10E3/UL (ref 150–450)
POTASSIUM BLD-SCNC: 4 MMOL/L (ref 3.5–5)
PROT SERPL-MCNC: 6.6 G/DL (ref 6–8)
PSA SERPL-MCNC: 0.45 UG/L (ref 0–4.5)
RBC # BLD AUTO: 4.2 10E6/UL (ref 4.4–5.9)
RBC #/AREA URNS AUTO: ABNORMAL /HPF
SODIUM SERPL-SCNC: 132 MMOL/L (ref 136–145)
SP GR UR STRIP: 1.01 (ref 1–1.03)
SQUAMOUS #/AREA URNS AUTO: ABNORMAL /LPF
TRIGL SERPL-MCNC: 54 MG/DL
UROBILINOGEN UR STRIP-ACNC: 0.2 E.U./DL
WBC # BLD AUTO: 5 10E3/UL (ref 4–11)
WBC #/AREA URNS AUTO: ABNORMAL /HPF

## 2022-02-14 PROCEDURE — 82306 VITAMIN D 25 HYDROXY: CPT | Performed by: FAMILY MEDICINE

## 2022-02-14 PROCEDURE — 81001 URINALYSIS AUTO W/SCOPE: CPT | Performed by: FAMILY MEDICINE

## 2022-02-14 PROCEDURE — 80061 LIPID PANEL: CPT | Performed by: FAMILY MEDICINE

## 2022-02-14 PROCEDURE — 84153 ASSAY OF PSA TOTAL: CPT | Performed by: FAMILY MEDICINE

## 2022-02-14 PROCEDURE — 80053 COMPREHEN METABOLIC PANEL: CPT | Performed by: FAMILY MEDICINE

## 2022-02-14 PROCEDURE — 86803 HEPATITIS C AB TEST: CPT | Performed by: FAMILY MEDICINE

## 2022-02-14 PROCEDURE — 85027 COMPLETE CBC AUTOMATED: CPT | Performed by: FAMILY MEDICINE

## 2022-02-14 PROCEDURE — G0438 PPPS, INITIAL VISIT: HCPCS | Performed by: FAMILY MEDICINE

## 2022-02-14 PROCEDURE — 36415 COLL VENOUS BLD VENIPUNCTURE: CPT | Performed by: FAMILY MEDICINE

## 2022-02-14 ASSESSMENT — ACTIVITIES OF DAILY LIVING (ADL): CURRENT_FUNCTION: NO ASSISTANCE NEEDED

## 2022-02-14 NOTE — PATIENT INSTRUCTIONS
Patient Education   Personalized Prevention Plan  You are due for the preventive services outlined below.  Your care team is available to assist you in scheduling these services.  If you have already completed any of these items, please share that information with your care team to update in your medical record.  Health Maintenance Due   Topic Date Due     Breathing Capacity Test  Never done     ANNUAL REVIEW OF HM ORDERS  Never done     COPD Action Plan  Never done     Hepatitis C Screening  Never done     Zoster (Shingles) Vaccine (2 of 3) 07/14/2016     AORTIC ANEURYSM SCREENING (SYSTEM ASSIGNED)  Never done      Patient Education   Alcohol Use     Many people can enjoy a glass of wine or beer without any negative consequences to their health. According to the Centers for Disease Control and Prevention (CDC), having one or fewer drinks per day for women and two or fewer per day for men is considered moderate drinking.     When people drink more than moderately, it can become concerning. Excessive drinking is defined as consuming 15 drinks or more per week for men and 8 drinks or more per week for women. There are various health problems associated with excessive drinking, which include:    Damage to vital organs like the heart, brain, liver and pancreas    Harm to the digestive tract    Weaken the immune system    Higher risk for heart disease and cancer    There are many resources available to people who are addicted to alcohol. A counselor or other health care provider can give you support. So can a , , or rabbi who is trained in substance abuse counseling. Friends and family may also help once you are connected with professionals.

## 2022-02-14 NOTE — PROGRESS NOTES
"SUBJECTIVE:   Rip Crockett is a 71 year old male who presents for Preventive Visit.  Patient comes in for annual wellness visit.    He has been doing well denies problems.    Former smoker please see below we will get a yearly low-dose chest CT in late May, also due for colonoscopy in July 2023 to follow-up on adenomatous colon polyps.    Nocturia x1    Hypertension controlled    No additional concern or issue    Patient has been advised of split billing requirements and indicates understanding: Yes  Are you in the first 12 months of your Medicare coverage?  No    Healthy Habits:     In general, how would you rate your overall health?  Good    Frequency of exercise:  2-3 days/week    Duration of exercise:  45-60 minutes    Do you usually eat at least 4 servings of fruit and vegetables a day, include whole grains    & fiber and avoid regularly eating high fat or \"junk\" foods?  Yes    Taking medications regularly:  Yes    Ability to successfully perform activities of daily living:  No assistance needed    Home Safety:  No safety concerns identified    Hearing Impairment:  No hearing concerns    In the past 6 months, have you been bothered by leaking of urine?  No    In general, how would you rate your overall mental or emotional health?  Excellent      PHQ-2 Total Score: 0    Additional concerns today:  No    Do you feel safe in your environment? Yes    Have you ever done Advance Care Planning? (For example, a Health Directive, POLST, or a discussion with a medical provider or your loved ones about your wishes): Yes, advance care planning is on file.      Fall risk  Fallen 2 or more times in the past year?: No  Any fall with injury in the past year?: No  click delete button to remove this line now  Cognitive Screening   1) Repeat 3 items (Leader, Season, Table)      2) Clock draw:   NORMAL  3) 3 item recall:   Recalls 3 objects  Results: 3 items recalled: COGNITIVE IMPAIRMENT LESS LIKELY    Mini-CogTM Copyright S " "[employer]. Licensed by the author for use in [facility]; reprinted with permission (zeina@[facility]). All rights reserved.      Do you have sleep apnea, excessive snoring or daytime drowsiness?: no    Reviewed and updated as needed this visit by clinical staff  Tobacco  Allergies  Meds             Reviewed and updated as needed this visit by Provider               Social History     Tobacco Use     Smoking status: Former Smoker     Packs/day: 1.00     Types: Cigarettes, Cigarettes, Cigarettes     Smokeless tobacco: Former User     Quit date: 6/10/2016     Tobacco comment: smoking since age 20 years old per pt  NO VAPING   Substance Use Topics     Alcohol use: Yes     Comment: Alcoholic Drinks/day: 3-5 beers per day     If you drink alcohol do you typically have >3 drinks per day or >7 drinks per week? Yes        Alcohol Use 2/14/2022   Prescreen: >3 drinks/day or >7 drinks/week? Yes   Prescreen: >3 drinks/day or >7 drinks/week? -   AUDIT SCORE  7       Current providers sharing in care for this patient include:   Patient Care Team:  Dat Francis MD as PCP - General (Family Practice)  Dat Francis MD as Assigned PCP    The following health maintenance items are reviewed in Epic and correct as of today:  Health Maintenance Due   Topic Date Due     SPIROMETRY  Never done     ANNUAL REVIEW OF HM ORDERS  Never done     COPD ACTION PLAN  Never done     HEPATITIS C SCREENING  Never done     ZOSTER IMMUNIZATION (2 of 3) 07/14/2016     AORTIC ANEURYSM SCREENING (SYSTEM ASSIGNED)  Never done             Review of Systems  10 point review of systems positive as outlined above otherwise negative.      OBJECTIVE:   /72 (BP Location: Left arm, Patient Position: Sitting, Cuff Size: Adult Regular)   Pulse 67   Resp 20   Wt 70.3 kg (155 lb)   SpO2 98%   BMI 25.02 kg/m   Estimated body mass index is 25.02 kg/m  as calculated from the following:    Height as of 8/16/21: 1.676 m (5' 6\").    Weight as of this " encounter: 70.3 kg (155 lb).  Physical Exam  General appearance no acute distress    Vital signs are stable O2 sat look good    HEENT canals and TMs normal oropharynx is clear pupils react normally extract movements full    Neck without bruit or thyroid fullness    Lungs clear no rales or rhonchi good air exchange    Heart regular S1-S2 rate in the 60s no murmur.    Abdomen nontender no masses.    No axillary or inguinal adenopathy    Normal descended testes no evidence of hernia    Rectal was done prostate slightly enlarged symmetric no nodules.    Lower extremities without edema normal pulses.    Skin is normal no rashes no worrisome skin lesions.    Labs hepatitis C CMP PSA lipid vitamin D CBC and urine pending.        ASSESSMENT / PLAN:       ICD-10-CM    1. Encounter for Medicare annual wellness exam  Z00.00    2. Former smoker  Z87.891 CT Chest Lung Cancer Scrn Low Dose wo     US Abdominal Aorta Imaging   3. Pulmonary emphysema, unspecified emphysema type (H)  J43.9    4. Benign essential hypertension  I10 Comprehensive metabolic panel (BMP + Alb, Alk Phos, ALT, AST, Total. Bili, TP)     Lipid Profile (Chol, Trig, HDL, LDL calc)     UA with Microscopic reflex to Culture - Clinic Collect   5. Adenomatous polyp of colon, unspecified part of colon  D12.6 CBC with platelets   6. Nocturia  R35.1 PSA, tumor marker   7. Vitamin D deficiency  E55.9 Vitamin D Deficiency   8. Encounter for hepatitis C screening test for low risk patient  Z11.59 Hepatitis C antibody       Annual wellness visit stable no concerns regarding cognition no concerns regarding falling.  He is quite active.    History of pulmonary emphysema noted on imaging but no significant symptoms    Patient is a former smoker    He will get a low-dose chest CT after May 18, 2022, yearly check.    Also will get a one-time ultrasound of the abdominal aorta.    Patient has a 35-pack-year history he quit smoking about 12 years ago.    Additional labs pending,  "patient will be contacted regarding those results.  Continue on losartan blood pressure under control.    Due for follow-up colonoscopy in July 2023 he had a colon polyp, adenomatous in July 2018.          Estimated body mass index is 25.02 kg/m  as calculated from the following:    Height as of 8/16/21: 1.676 m (5' 6\").    Weight as of this encounter: 70.3 kg (155 lb).        He reports that he has quit smoking. His smoking use included cigarettes, cigarettes, and cigarettes. He smoked 1.00 pack per day. He quit smokeless tobacco use about 5 years ago.      Appropriate preventive services were discussed with this patient, including applicable screening as appropriate for cardiovascular disease, diabetes, osteopenia/osteoporosis, and glaucoma.  As appropriate for age/gender, discussed screening for colorectal cancer, prostate cancer, breast cancer, and cervical cancer. Checklist reviewing preventive services available has been given to the patient.    Reviewed patients plan of care and provided an AVS.     Counseling Resources:  ATP IV Guidelines  Pooled Cohorts Equation Calculator  Breast Cancer Risk Calculator  Breast Cancer: Medication to Reduce Risk  FRAX Risk Assessment  ICSI Preventive Guidelines  Dietary Guidelines for Americans, 2010  [employer] MyPlate  ASA Prophylaxis  Lung CA Screening    Dat Francis MD  [facility]    Identified Health Risks:  "

## 2022-02-14 NOTE — PROGRESS NOTES
"    The patient reports that he drinks more than one alcoholic drink per day and sometimes engages in binge or excessive drinking. He was counseled and given information about possible harmful effects of excessive alcohol intake as well as where to get help for alcohol problems.  Answers for HPI/ROS submitted by the patient on 2/14/2022  In general, how would you rate your overall physical health?: good  Frequency of exercise:: 2-3 days/week  Do you usually eat at least 4 servings of fruit and vegetables a day, include whole grains & fiber, and avoid regularly eating high fat or \"junk\" foods? : Yes  Taking medications regularly:: Yes  Activities of Daily Living: no assistance needed  Home safety: no safety concerns identified  Hearing Impairment:: no hearing concerns  In the past 6 months, have you been bothered by leaking of urine?: No  In general, how would you rate your overall mental or emotional health?: excellent  Additional concerns today:: No  Duration of exercise:: 45-60 minutes        "

## 2022-02-15 LAB — DEPRECATED CALCIDIOL+CALCIFEROL SERPL-MC: 21 UG/L (ref 30–80)

## 2022-02-24 ENCOUNTER — HOSPITAL ENCOUNTER (OUTPATIENT)
Dept: ULTRASOUND IMAGING | Facility: CLINIC | Age: 72
Discharge: HOME | End: 2022-02-24
Attending: FAMILY MEDICINE | Admitting: FAMILY MEDICINE
Payer: COMMERCIAL

## 2022-02-24 DIAGNOSIS — I70.0 ATHEROSCLEROSIS OF ABDOMINAL AORTA (H): ICD-10-CM

## 2022-02-24 DIAGNOSIS — Z87.891 FORMER SMOKER: ICD-10-CM

## 2022-02-24 DIAGNOSIS — I71.40 ABDOMINAL AORTIC ANEURYSM (AAA) 30 TO 34 MM IN DIAMETER (H): Primary | ICD-10-CM

## 2022-02-24 PROCEDURE — 76775 US EXAM ABDO BACK WALL LIM: CPT

## 2022-02-24 RX ORDER — ATORVASTATIN CALCIUM 40 MG/1
40 TABLET, FILM COATED ORAL DAILY
Qty: 90 TABLET | Refills: 1 | Status: SHIPPED | OUTPATIENT
Start: 2022-02-24 | End: 2022-08-29

## 2022-05-23 DIAGNOSIS — Z87.891 FORMER SMOKER: ICD-10-CM

## 2022-06-13 ENCOUNTER — HOSPITAL ENCOUNTER (OUTPATIENT)
Dept: CT IMAGING | Facility: CLINIC | Age: 72
Discharge: HOME | End: 2022-06-13
Attending: FAMILY MEDICINE | Admitting: FAMILY MEDICINE
Payer: COMMERCIAL

## 2022-06-13 PROCEDURE — 71271 CT THORAX LUNG CANCER SCR C-: CPT

## 2022-06-14 ENCOUNTER — TELEPHONE (OUTPATIENT)
Dept: FAMILY MEDICINE | Facility: CLINIC | Age: 72
End: 2022-06-14
Payer: COMMERCIAL

## 2022-06-14 NOTE — TELEPHONE ENCOUNTER
----- Message from Dat Francis MD sent at 6/13/2022  9:42 AM CDT -----  Please contact this patient.  Let him know that the CT scan of the lung looked normal.  There was no evidence of lung cancer.  There was some stable very small 2 mm nodules which were unchanged.  Not worrisome    The recommendation is to continue screening with low-dose CT scan of the chest in 12 months

## 2022-08-05 DIAGNOSIS — R03.0 ELEVATED BP WITHOUT DIAGNOSIS OF HYPERTENSION: ICD-10-CM

## 2022-08-05 RX ORDER — LOSARTAN POTASSIUM 50 MG/1
50 TABLET ORAL DAILY
Qty: 90 TABLET | Refills: 1 | Status: SHIPPED | OUTPATIENT
Start: 2022-08-05 | End: 2023-02-01

## 2022-08-05 NOTE — TELEPHONE ENCOUNTER
"Last Written Prescription Date:  2/9/22  Last Fill Quantity: 90,  # refills: 1   Last office visit provider:  2/14/22     Requested Prescriptions   Pending Prescriptions Disp Refills     losartan (COZAAR) 50 MG tablet [Pharmacy Med Name: Losartan Potassium Oral Tablet 50 MG] 90 tablet 0     Sig: Take 1 tablet (50 mg) by mouth daily       Angiotensin-II Receptors Passed - 8/5/2022  2:00 AM        Passed - Last blood pressure under 140/90 in past 12 months     BP Readings from Last 3 Encounters:   02/14/22 120/72   08/16/21 128/68   04/20/21 (!) 142/78                 Passed - Recent (12 mo) or future (30 days) visit within the authorizing provider's specialty     Patient has had an office visit with the authorizing provider or a provider within the authorizing providers department within the previous 12 mos or has a future within next 30 days. See \"Patient Info\" tab in inbasket, or \"Choose Columns\" in Meds & Orders section of the refill encounter.              Passed - Medication is active on med list        Passed - Patient is age 18 or older        Passed - Normal serum creatinine on file in past 12 months     Recent Labs   Lab Test 02/14/22  0947   CR 0.78       Ok to refill medication if creatinine is low          Passed - Normal serum potassium on file in past 12 months     Recent Labs   Lab Test 02/14/22  0947   POTASSIUM 4.0                         Margaret Vargas, RN 08/05/22 6:55 PM  "

## 2022-08-28 DIAGNOSIS — I70.0 ATHEROSCLEROSIS OF ABDOMINAL AORTA (H): ICD-10-CM

## 2022-08-28 DIAGNOSIS — Z76.0 ENCOUNTER FOR MEDICATION REFILL: Primary | ICD-10-CM

## 2022-08-28 NOTE — TELEPHONE ENCOUNTER
"Former patient of Penny & has not established care with another provider.  Please assign refill request to covering provider per clinic standard process.    Last Written Prescription Date:  2/24/22  Last Fill Quantity: 90,  # refills: 1   Last office visit provider:  2/14/22     Requested Prescriptions   Pending Prescriptions Disp Refills     atorvastatin (LIPITOR) 40 MG tablet [Pharmacy Med Name: Atorvastatin Calcium Oral Tablet 40 MG] 90 tablet 0     Sig: TAKE ONE TABLET BY MOUTH ONE TIME DAILY       Statins Protocol Passed - 8/28/2022  9:53 AM        Passed - LDL on file in past 12 months     Recent Labs   Lab Test 02/14/22  0947                Passed - No abnormal creatine kinase in past 12 months     No lab results found.             Passed - Recent (12 mo) or future (30 days) visit within the authorizing provider's specialty     Patient has had an office visit with the authorizing provider or a provider within the authorizing providers department within the previous 12 mos or has a future within next 30 days. See \"Patient Info\" tab in inbasket, or \"Choose Columns\" in Meds & Orders section of the refill encounter.              Passed - Medication is active on med list        Passed - Patient is age 18 or older             Margaret Vargas, RN 08/28/22 2:34 PM  "

## 2022-08-29 RX ORDER — ATORVASTATIN CALCIUM 40 MG/1
TABLET, FILM COATED ORAL
Qty: 90 TABLET | Refills: 3 | Status: SHIPPED | OUTPATIENT
Start: 2022-08-29 | End: 2023-08-14

## 2022-09-24 ENCOUNTER — HEALTH MAINTENANCE LETTER (OUTPATIENT)
Age: 72
End: 2022-09-24

## 2022-10-13 ENCOUNTER — OFFICE VISIT (OUTPATIENT)
Dept: FAMILY MEDICINE | Facility: CLINIC | Age: 72
End: 2022-10-13
Payer: COMMERCIAL

## 2022-10-13 VITALS
WEIGHT: 147 LBS | HEIGHT: 66 IN | HEART RATE: 62 BPM | BODY MASS INDEX: 23.63 KG/M2 | SYSTOLIC BLOOD PRESSURE: 147 MMHG | DIASTOLIC BLOOD PRESSURE: 82 MMHG | TEMPERATURE: 97.7 F

## 2022-10-13 DIAGNOSIS — E87.1 HYPONATREMIA: ICD-10-CM

## 2022-10-13 DIAGNOSIS — J43.9 PULMONARY EMPHYSEMA, UNSPECIFIED EMPHYSEMA TYPE (H): ICD-10-CM

## 2022-10-13 DIAGNOSIS — I70.0 ATHEROSCLEROSIS OF ABDOMINAL AORTA (H): ICD-10-CM

## 2022-10-13 DIAGNOSIS — I10 BENIGN ESSENTIAL HYPERTENSION: Primary | ICD-10-CM

## 2022-10-13 DIAGNOSIS — I71.40 ABDOMINAL AORTIC ANEURYSM (AAA) 30 TO 34 MM IN DIAMETER (H): ICD-10-CM

## 2022-10-13 LAB
ANION GAP SERPL CALCULATED.3IONS-SCNC: 10 MMOL/L (ref 7–15)
BUN SERPL-MCNC: 14.4 MG/DL (ref 8–23)
CALCIUM SERPL-MCNC: 9.1 MG/DL (ref 8.8–10.2)
CHLORIDE SERPL-SCNC: 98 MMOL/L (ref 98–107)
CREAT SERPL-MCNC: 0.81 MG/DL (ref 0.67–1.17)
DEPRECATED HCO3 PLAS-SCNC: 25 MMOL/L (ref 22–29)
GFR SERPL CREATININE-BSD FRML MDRD: >90 ML/MIN/1.73M2
GLUCOSE SERPL-MCNC: 94 MG/DL (ref 70–99)
OSMOLALITY SERPL: 280 MMOL/KG (ref 280–301)
POTASSIUM SERPL-SCNC: 4.3 MMOL/L (ref 3.4–5.3)
SODIUM SERPL-SCNC: 133 MMOL/L (ref 136–145)

## 2022-10-13 PROCEDURE — 36415 COLL VENOUS BLD VENIPUNCTURE: CPT | Performed by: FAMILY MEDICINE

## 2022-10-13 PROCEDURE — 80048 BASIC METABOLIC PNL TOTAL CA: CPT | Performed by: FAMILY MEDICINE

## 2022-10-13 PROCEDURE — 99214 OFFICE O/P EST MOD 30 MIN: CPT | Performed by: FAMILY MEDICINE

## 2022-10-13 PROCEDURE — 83930 ASSAY OF BLOOD OSMOLALITY: CPT | Performed by: FAMILY MEDICINE

## 2022-10-13 NOTE — PROGRESS NOTES
Assessment & Plan     Benign essential hypertension  Uncontrolled, management discussed, adjust medication if persistently elevated at follow-up.    Hyponatremia  Medical record reviewed, sodium has been mildly low on and off since 2010  - Basic metabolic panel  (Ca, Cl, CO2, Creat, Gluc, K, Na, BUN); Future  - Osmolality; Future  - Basic metabolic panel  (Ca, Cl, CO2, Creat, Gluc, K, Na, BUN)  - Osmolality    Pulmonary emphysema, unspecified emphysema type (H)  Patient quit smoking more than 10 years ago, currently asymptomatic, we will plan on doing a PFTs down the road to monitor.    Atherosclerosis of abdominal aorta (H)  About 3.1 cm x 3.4 in February 2022, radiologist guidelines recommend follow-up every 3 years.  Currently on statin therapy could  maximize down the road as patient tolerated.  IMPRESSION:  1.  Abdominal aortic aneurysm which measures 3.1 cm upper abdomen and 3.3 x 3.4 cm infrarenal abdominal aorta with extensive atheromatous plaque.     REFERENCE:  [facility] practice guidelines on the care of patients with an abdominal aortic aneurysm. Mark BROOKS. J Vasc Surg, 2018. PMID: [id number].     Aorta size: 3.0 cm to 3.9 cm: Surveillance imaging at 3-year intervals.    Review of external notes as documented elsewhere in note  25 minutes spent on the date of the encounter doing chart review, review of outside records, review of test results, interpretation of tests, patient visit and documentation        Work on weight loss  Regular exercise    No follow-ups on file.    Lauren Rangel MD  [facility]    James Erwin is a 72 year old accompanied by his self, presenting for the following health issues:  Establish Care      History of Present Illness       Reason for visit:  Meet and Greet    He eats 2-3 servings of fruits and vegetables daily.He consumes 0 sweetened beverage(s) daily.He exercises with enough effort to increase his heart rate 30 to 60 minutes per day.  He  "exercises with enough effort to increase his heart rate 7 days per week.   He is taking medications regularly.     Ex patient of Dr. Francis, is here to establish care, he takes losartan 50 mg daily for hypertension, Lipitor 40 mg daily for hyperlipidemia, as needed sildenafil generic, daily baby aspirin overall doing fine no specific complaint.  Spent some time with grandkids, quit smoking 10 to 15 years ago.  Screening lung cancer did show emphysematous changes in the lung coronary calcification.    Review of Systems   Constitutional, HEENT, cardiovascular, pulmonary, gi and gu systems are negative, except as otherwise noted.      Objective    BP (!) 151/80 (BP Location: Right arm, Patient Position: Sitting, Cuff Size: Adult Regular)   Pulse 67   Temp 97.7  F (36.5  C) (Temporal)   Ht 1.677 m (5' 6.02\")   Wt 66.7 kg (147 lb)   BMI 23.71 kg/m    Body mass index is 23.71 kg/m .  Physical Exam   GENERAL: healthy, alert and no distress  NECK: no adenopathy, no asymmetry, masses, or scars and thyroid normal to palpation  RESP: lungs clear to auscultation - no rales, rhonchi or wheezes  CV: regular rate and rhythm, normal S1 S2, no S3 or S4, no murmur, click or rub, no peripheral edema and peripheral pulses strong  ABDOMEN: soft, nontender, no hepatosplenomegaly, no masses and bowel sounds normal  MS: no gross musculoskeletal defects noted, no edema    Results for orders placed or performed in visit on 10/13/22   Basic metabolic panel  (Ca, Cl, CO2, Creat, Gluc, K, Na, BUN)     Status: Abnormal   Result Value Ref Range    Sodium 133 (L) 136 - 145 mmol/L    Potassium 4.3 3.4 - 5.3 mmol/L    Chloride 98 98 - 107 mmol/L    Carbon Dioxide (CO2) 25 22 - 29 mmol/L    Anion Gap 10 7 - 15 mmol/L    Urea Nitrogen 14.4 8.0 - 23.0 mg/dL    Creatinine 0.81 0.67 - 1.17 mg/dL    Calcium 9.1 8.8 - 10.2 mg/dL    Glucose 94 70 - 99 mg/dL    GFR Estimate >90 >60 mL/min/1.73m2   Osmolality     Status: Normal   Result Value Ref Range    " Osmolality Blood 280 280 - 301 mmol/kg    Narrative    Greater than 385 mmol/kg relates to stupor in hyperglycemia   Greater than 400 mmol/kg can relate to seizures   Greater than 420 mmol/kg can be lethal    Serum Osmalar Gap:   Normal <10   Larger suggest unmeasured substances present in serum (ethanol, methanol, isopropanol, mannitol, ethylene glycol).

## 2022-10-14 PROBLEM — N52.9 ERECTILE DYSFUNCTION, UNSPECIFIED ERECTILE DYSFUNCTION TYPE: Status: ACTIVE | Noted: 2021-04-20

## 2023-01-31 DIAGNOSIS — R03.0 ELEVATED BP WITHOUT DIAGNOSIS OF HYPERTENSION: ICD-10-CM

## 2023-02-01 RX ORDER — LOSARTAN POTASSIUM 50 MG/1
50 TABLET ORAL DAILY
Qty: 90 TABLET | Refills: 0 | Status: SHIPPED | OUTPATIENT
Start: 2023-02-01 | End: 2023-06-04

## 2023-02-01 NOTE — TELEPHONE ENCOUNTER
"Routing refill request to provider for review/approval because:  bp out of range    Last Written Prescription Date:  8/5/22  Last Fill Quantity: 90,  # refills: 1   Last office visit provider:  10/13/22     Requested Prescriptions   Pending Prescriptions Disp Refills     losartan (COZAAR) 50 MG tablet [Pharmacy Med Name: Losartan Potassium Oral Tablet 50 MG] 90 tablet 0     Sig: Take 1 tablet (50 mg) by mouth daily.       Angiotensin-II Receptors Failed - 1/31/2023  2:00 AM        Failed - Last blood pressure under 140/90 in past 12 months     BP Readings from Last 3 Encounters:   10/13/22 (!) 147/82   02/14/22 120/72   08/16/21 128/68                 Passed - Recent (12 mo) or future (30 days) visit within the authorizing provider's specialty     Patient has had an office visit with the authorizing provider or a provider within the authorizing providers department within the previous 12 mos or has a future within next 30 days. See \"Patient Info\" tab in inbasket, or \"Choose Columns\" in Meds & Orders section of the refill encounter.              Passed - Medication is active on med list        Passed - Patient is age 18 or older        Passed - Normal serum creatinine on file in past 12 months     Recent Labs   Lab Test 10/13/22  1036   CR 0.81       Ok to refill medication if creatinine is low          Passed - Normal serum potassium on file in past 12 months     Recent Labs   Lab Test 10/13/22  1036   POTASSIUM 4.3                         Margaret Vargas, RN 01/31/23 8:55 PM  "

## 2023-02-12 ASSESSMENT — ENCOUNTER SYMPTOMS
NAUSEA: 0
ABDOMINAL PAIN: 0
EYE PAIN: 0
COUGH: 1
JOINT SWELLING: 0
SORE THROAT: 0
HEMATURIA: 0
HEARTBURN: 0
DIZZINESS: 0
PALPITATIONS: 0
FREQUENCY: 0
DIARRHEA: 0
CHILLS: 0
NERVOUS/ANXIOUS: 0
CONSTIPATION: 0
HEMATOCHEZIA: 0
ARTHRALGIAS: 1
PARESTHESIAS: 0
SHORTNESS OF BREATH: 0
WEAKNESS: 0
HEADACHES: 0
FEVER: 0
MYALGIAS: 1
DYSURIA: 0

## 2023-02-12 ASSESSMENT — ACTIVITIES OF DAILY LIVING (ADL): CURRENT_FUNCTION: NO ASSISTANCE NEEDED

## 2023-02-14 ENCOUNTER — OFFICE VISIT (OUTPATIENT)
Dept: FAMILY MEDICINE | Facility: CLINIC | Age: 73
End: 2023-02-14
Payer: COMMERCIAL

## 2023-02-14 VITALS
HEIGHT: 66 IN | BODY MASS INDEX: 22.66 KG/M2 | RESPIRATION RATE: 15 BRPM | HEART RATE: 76 BPM | TEMPERATURE: 98.1 F | DIASTOLIC BLOOD PRESSURE: 77 MMHG | SYSTOLIC BLOOD PRESSURE: 129 MMHG | OXYGEN SATURATION: 97 % | WEIGHT: 141 LBS

## 2023-02-14 DIAGNOSIS — I10 BENIGN ESSENTIAL HYPERTENSION: ICD-10-CM

## 2023-02-14 DIAGNOSIS — J43.9 ACUTE EXACERBATION OF EMPHYSEMA (H): ICD-10-CM

## 2023-02-14 DIAGNOSIS — R35.1 NOCTURIA: ICD-10-CM

## 2023-02-14 DIAGNOSIS — Z00.00 ENCOUNTER FOR MEDICARE ANNUAL WELLNESS EXAM: Primary | ICD-10-CM

## 2023-02-14 DIAGNOSIS — J43.9 PULMONARY EMPHYSEMA, UNSPECIFIED EMPHYSEMA TYPE (H): ICD-10-CM

## 2023-02-14 DIAGNOSIS — I70.0 ATHEROSCLEROSIS OF ABDOMINAL AORTA (H): ICD-10-CM

## 2023-02-14 DIAGNOSIS — Z12.5 SPECIAL SCREENING FOR MALIGNANT NEOPLASM OF PROSTATE: ICD-10-CM

## 2023-02-14 LAB
ALBUMIN SERPL BCG-MCNC: 4.2 G/DL (ref 3.5–5.2)
ALP SERPL-CCNC: 60 U/L (ref 40–129)
ALT SERPL W P-5'-P-CCNC: 20 U/L (ref 10–50)
ANION GAP SERPL CALCULATED.3IONS-SCNC: 12 MMOL/L (ref 7–15)
AST SERPL W P-5'-P-CCNC: 25 U/L (ref 10–50)
BILIRUB SERPL-MCNC: 0.7 MG/DL
BUN SERPL-MCNC: 14.1 MG/DL (ref 8–23)
CALCIUM SERPL-MCNC: 9.5 MG/DL (ref 8.8–10.2)
CHLORIDE SERPL-SCNC: 96 MMOL/L (ref 98–107)
CHOLEST SERPL-MCNC: 137 MG/DL
CREAT SERPL-MCNC: 0.78 MG/DL (ref 0.67–1.17)
DEPRECATED HCO3 PLAS-SCNC: 24 MMOL/L (ref 22–29)
GFR SERPL CREATININE-BSD FRML MDRD: >90 ML/MIN/1.73M2
GLUCOSE SERPL-MCNC: 106 MG/DL (ref 70–99)
HDLC SERPL-MCNC: 64 MG/DL
LDLC SERPL CALC-MCNC: 65 MG/DL
NONHDLC SERPL-MCNC: 73 MG/DL
POTASSIUM SERPL-SCNC: 4.5 MMOL/L (ref 3.4–5.3)
PROT SERPL-MCNC: 6.7 G/DL (ref 6.4–8.3)
PSA SERPL-MCNC: 0.6 NG/ML (ref 0–6.5)
SODIUM SERPL-SCNC: 132 MMOL/L (ref 136–145)
TRIGL SERPL-MCNC: 40 MG/DL

## 2023-02-14 PROCEDURE — 36415 COLL VENOUS BLD VENIPUNCTURE: CPT | Performed by: FAMILY MEDICINE

## 2023-02-14 PROCEDURE — 99213 OFFICE O/P EST LOW 20 MIN: CPT | Mod: 25 | Performed by: FAMILY MEDICINE

## 2023-02-14 PROCEDURE — 80061 LIPID PANEL: CPT | Performed by: FAMILY MEDICINE

## 2023-02-14 PROCEDURE — 80053 COMPREHEN METABOLIC PANEL: CPT | Performed by: FAMILY MEDICINE

## 2023-02-14 PROCEDURE — 84153 ASSAY OF PSA TOTAL: CPT | Performed by: FAMILY MEDICINE

## 2023-02-14 PROCEDURE — G0439 PPPS, SUBSEQ VISIT: HCPCS | Performed by: FAMILY MEDICINE

## 2023-02-14 RX ORDER — AZITHROMYCIN 250 MG/1
TABLET, FILM COATED ORAL
Qty: 6 TABLET | Refills: 0 | Status: SHIPPED | OUTPATIENT
Start: 2023-02-14 | End: 2023-02-19

## 2023-02-14 ASSESSMENT — ENCOUNTER SYMPTOMS
HEARTBURN: 0
JOINT SWELLING: 0
SORE THROAT: 0
DIARRHEA: 0
HEADACHES: 0
CONSTIPATION: 0
PARESTHESIAS: 0
CHILLS: 0
WEAKNESS: 0
HEMATURIA: 0
NERVOUS/ANXIOUS: 0
ARTHRALGIAS: 1
SHORTNESS OF BREATH: 0
FREQUENCY: 0
DYSURIA: 0
EYE PAIN: 0
DIZZINESS: 0
PALPITATIONS: 0
COUGH: 1
HEMATOCHEZIA: 0
NAUSEA: 0
ABDOMINAL PAIN: 0
MYALGIAS: 1
FEVER: 0

## 2023-02-14 ASSESSMENT — ACTIVITIES OF DAILY LIVING (ADL): CURRENT_FUNCTION: NO ASSISTANCE NEEDED

## 2023-02-14 NOTE — PROGRESS NOTES
"SUBJECTIVE:   Rip is a 72 year old who presents for Preventive Visit.    Patient has been advised of split billing requirements and indicates understanding: Yes  Are you in the first 12 months of your Medicare coverage?  No    Healthy Habits:     In general, how would you rate your overall health?  Good    Frequency of exercise:  6-7 days/week    Duration of exercise:  Greater than 60 minutes    Do you usually eat at least 4 servings of fruit and vegetables a day, include whole grains    & fiber and avoid regularly eating high fat or \"junk\" foods?  Yes    Taking medications regularly:  Yes    Medication side effects:  None    Ability to successfully perform activities of daily living:  No assistance needed    Home Safety:  No safety concerns identified    Hearing Impairment:  No hearing concerns    In the past 6 months, have you been bothered by leaking of urine?  No    In general, how would you rate your overall mental or emotional health?  Excellent      PHQ-2 Total Score: 0    Additional concerns today:  No      Have you ever done Advance Care Planning? (For example, a Health Directive, POLST, or a discussion with a medical provider or your loved ones about your wishes): Yes, advance care planning is on file.       Fall risk  Fallen 2 or more times in the past year?: No  Any fall with injury in the past year?: No    Cognitive Screening   1) Repeat 3 items (Leader, Season, Table)    2) Clock draw: NORMAL  3) 3 item recall: Recalls NO objects   Results: clock Normal, but Abnormal for words 0    Mini-CogTM Copyright CHAS Mcneil. Licensed by the author for use in [facility]; reprinted with permission (zeina@.[facility]). All rights reserved.      Do you have sleep apnea, excessive snoring or daytime drowsiness?: no    Reviewed and updated as needed this visit by clinical staff     Meds              Reviewed and updated as needed this visit by Provider                 Social History     Tobacco Use     Smoking " status: Former     Packs/day: 1.00     Types: Cigarettes     Smokeless tobacco: Former     Quit date: 6/10/2016     Tobacco comments:     smoking since age 20 years old per pt  NO VAPING   Substance Use Topics     Alcohol use: Yes     Comment: Alcoholic Drinks/day: 3-5 beers per day     If you drink alcohol do you typically have >3 drinks per day or >7 drinks per week? No    Alcohol Use 2/14/2023   Prescreen: >3 drinks/day or >7 drinks/week? -   Prescreen: >3 drinks/day or >7 drinks/week? No   AUDIT SCORE  -           PROBLEMS TO ADD ON...  Cough for the past week and a half, has been taking over-the-counter cough medication with no improvement, coughing up green mucus, denies any fever or chills, no difficulty breathing history of emphysema, quit smoking about 11 years ago  Current providers sharing in care for this patient include:   Patient Care Team:  Lauren Rangel MD as PCP - General (Family Medicine)  Lauren Rangel MD as Assigned PCP    The following health maintenance items are reviewed in Epic and correct as of today:  Health Maintenance   Topic Date Due     SPIROMETRY  Never done     COPD ACTION PLAN  Never done     MEDICARE ANNUAL WELLNESS VISIT  05/18/2017     LUNG CANCER SCREENING  06/13/2023     ANNUAL REVIEW OF HM ORDERS  10/13/2023     FALL RISK ASSESSMENT  02/14/2024     LIPID  02/14/2027     DTAP/TDAP/TD IMMUNIZATION (3 - Td or Tdap) 10/11/2027     ADVANCE CARE PLANNING  02/14/2028     COLORECTAL CANCER SCREENING  07/12/2028     HEPATITIS C SCREENING  Completed     PHQ-2 (once per calendar year)  Completed     INFLUENZA VACCINE  Completed     Pneumococcal Vaccine: 65+ Years  Completed     ZOSTER IMMUNIZATION  Completed     AORTIC ANEURYSM SCREENING (SYSTEM ASSIGNED)  Completed     COVID-19 Vaccine  Completed     IPV IMMUNIZATION  Aged Out     MENINGITIS IMMUNIZATION  Aged Out     Lab work is in process  Labs reviewed in EPIC  BP Readings from Last 3 Encounters:   02/14/23 129/77   10/13/22 (!)  147/82   02/14/22 120/72    Wt Readings from Last 3 Encounters:   02/14/23 64 kg (141 lb)   10/13/22 66.7 kg (147 lb)   02/14/22 70.3 kg (155 lb)                  Patient Active Problem List   Diagnosis     Adenomatous colon polyp     Nicotine dependence     Carpal tunnel syndrome of left wrist     Benign essential hypertension     Pulmonary emphysema, unspecified emphysema type (H)     Former smoker     Abdominal aortic aneurysm (AAA) 30 to 34 mm in diameter     Atherosclerosis of abdominal aorta (H)     Erectile dysfunction, unspecified erectile dysfunction type     Past Surgical History:   Procedure Laterality Date     ORTHOPEDIC SURGERY         Social History     Tobacco Use     Smoking status: Former     Packs/day: 1.00     Types: Cigarettes     Smokeless tobacco: Former     Quit date: 6/10/2016     Tobacco comments:     smoking since age 20 years old per pt  NO VAPING   Substance Use Topics     Alcohol use: Yes     Comment: Alcoholic Drinks/day: 3-5 beers per day     Family History   Problem Relation Age of Onset     No Known Problems Mother      No Known Problems Father      No Known Problems Maternal Grandmother      No Known Problems Maternal Grandfather      No Known Problems Paternal Grandmother      No Known Problems Paternal Grandfather      No Known Problems Sister      No Known Problems Brother      No Known Problems Maternal Half-Brother      No Known Problems Maternal Half-Sister      No Known Problems Paternal Half-Brother      No Known Problems Paternal Half-Sister      No Known Problems Daughter      No Known Problems Son      No Known Problems Maternal Aunt      No Known Problems Maternal Uncle      No Known Problems Paternal Aunt      No Known Problems Paternal Uncle      No Known Problems Cousin      No Known Problems Other          Current Outpatient Medications   Medication Sig Dispense Refill     aspirin (ASA) 81 MG EC tablet Take 1 tablet (81 mg) by mouth daily 90 tablet 3     atorvastatin  (LIPITOR) 40 MG tablet TAKE ONE TABLET BY MOUTH ONE TIME DAILY 90 tablet 3     azithromycin (ZITHROMAX) 250 MG tablet Take 2 tablets (500 mg) by mouth daily for 1 day, THEN 1 tablet (250 mg) daily for 4 days. 6 tablet 0     ibuprofen (ADVIL,MOTRIN) 200 MG tablet [IBUPROFEN (ADVIL,MOTRIN) 200 MG TABLET] Take 200 mg by mouth every 6 (six) hours as needed for pain.       losartan (COZAAR) 50 MG tablet Take 1 tablet (50 mg) by mouth daily. 90 tablet 0     sildenafil (REVATIO) 20 MG tablet [SILDENAFIL (REVATIO) 20 MG TABLET] 1-3 BY MOUTH 1 HOUR PRIOR TO SEX AS NEEDED 40 tablet 5     Allergies   Allergen Reactions     Penicillins      Cephalexin Hives and Rash     Cephalosporins Rash     Recent Labs   Lab Test 02/14/23  0836 10/13/22  1036 02/14/22  0947 08/16/21  1153 04/20/21  0917   LDL 65  --  107  --  97   HDL 64  --  78  --  84   TRIG 40  --  54  --  53   ALT 20  --  20  --  21   CR 0.78 0.81 0.78   < > 0.78   GFRESTIMATED >90 >90 >90   < > >60   GFRESTBLACK  --   --   --   --  >60   POTASSIUM 4.5 4.3 4.0   < > 4.6    < > = values in this interval not displayed.              Review of Systems   Constitutional: Negative for chills and fever.   HENT: Positive for congestion. Negative for ear pain, hearing loss and sore throat.    Eyes: Negative for pain and visual disturbance.   Respiratory: Positive for cough. Negative for shortness of breath.    Cardiovascular: Negative for chest pain, palpitations and peripheral edema.   Gastrointestinal: Negative for abdominal pain, constipation, diarrhea, heartburn, hematochezia and nausea.   Genitourinary: Negative for dysuria, frequency, genital sores, hematuria, impotence, penile discharge and urgency.   Musculoskeletal: Positive for arthralgias and myalgias. Negative for joint swelling.   Skin: Negative for rash.   Neurological: Negative for dizziness, weakness, headaches and paresthesias.   Psychiatric/Behavioral: Negative for mood changes. The patient is not  "nervous/anxious.          OBJECTIVE:   /77 (BP Location: Left arm, Patient Position: Sitting, Cuff Size: Adult Regular)   Pulse 76   Temp 98.1  F (36.7  C) (Temporal)   Resp 15   Ht 1.67 m (5' 5.75\")   Wt 64 kg (141 lb)   SpO2 97%   BMI 22.93 kg/m   Estimated body mass index is 22.93 kg/m  as calculated from the following:    Height as of this encounter: 1.67 m (5' 5.75\").    Weight as of this encounter: 64 kg (141 lb).  Physical Exam  GENERAL: healthy, alert and no distress  EYES: Eyes grossly normal to inspection, PERRL and conjunctivae and sclerae normal  HENT: ear canals and TM's normal, nose and mouth without ulcers or lesions  NECK: no adenopathy, no asymmetry, masses, or scars and thyroid normal to palpation  RESP: decreased breath sounds bibasilar, bilateral and Minimal rales.  CV: regular rate and rhythm, normal S1 S2, no S3 or S4, no murmur, click or rub, no peripheral edema and peripheral pulses strong  ABDOMEN: soft, nontender, no hepatosplenomegaly, no masses and bowel sounds normal  MS: no gross musculoskeletal defects noted, no edema  SKIN: no suspicious lesions or rashes  NEURO: Normal strength and tone, mentation intact and speech normal  PSYCH: mentation appears normal, affect normal/bright    Diagnostic Test Results:  Labs reviewed in Epic    ASSESSMENT / PLAN:   (Z00.00) Encounter for Medicare annual wellness exam  (primary encounter diagnosis)  Comment:   Plan:     (J43.9) Pulmonary emphysema, unspecified emphysema type (H)  Comment:   Plan: Spirometry, Breathing Capacity, Normal Order,         Clinic Performed            (I10) Benign essential hypertension  Comment:   Plan: **Comprehensive metabolic panel FUTURE 2mo            (Z12.5) Special screening for malignant neoplasm of prostate  Comment:   Plan: PSA, tumor marker            (I70.0) Atherosclerosis of abdominal aorta (H)  Comment:   Plan: Lipid panel reflex to direct LDL Fasting            (J43.9) Acute exacerbation of " emphysema (H)  Comment:   Plan: azithromycin (ZITHROMAX) 250 MG tablet            (R35.1) Nocturia  Comment:   Plan: PSA, tumor marker          Acute exacerbation of emphysema, not improved with symptomatic care at home included cough drops, management discussed, azithromycin prescribed, consider adding oral steroid.  PFTs down the road.  Chronic hyponatremia, stable, asymptomatic,.  Abdominal aneurysm stable on recent imaging, about 3.4 x 3.1, due for follow-up ultrasound 2025, sooner if there is any new complaint.        Patient has been advised of split billing requirements and indicates understanding: Yes      COUNSELING:  Reviewed preventive health counseling, as reflected in patient instructions       Regular exercise       Healthy diet/nutrition       Vision screening       Hearing screening       Dental care       Bladder control       Prostate cancer screening        He reports that he has quit smoking. His smoking use included cigarettes. He smoked an average of 1 pack per day. He quit smokeless tobacco use about 6 years ago.      Appropriate preventive services were discussed with this patient, including applicable screening as appropriate for cardiovascular disease, diabetes, osteopenia/osteoporosis, and glaucoma.  As appropriate for age/gender, discussed screening for colorectal cancer, prostate cancer, breast cancer, and cervical cancer. Checklist reviewing preventive services available has been given to the patient.    Reviewed patients plan of care and provided an AVS. The Intermediate Care Plan ( asthma action plan, low back pain action plan, and migraine action plan) for Rip meets the Care Plan requirement. This Care Plan has been established and reviewed with the Patient.          Lauren Rangel MD  [facility]    Identified Health Risks:

## 2023-02-14 NOTE — PATIENT INSTRUCTIONS
Patient Education   Personalized Prevention Plan  You are due for the preventive services outlined below.  Your care team is available to assist you in scheduling these services.  If you have already completed any of these items, please share that information with your care team to update in your medical record.  Health Maintenance Due   Topic Date Due     Breathing Capacity Test  Never done     COPD Action Plan  Never done     Annual Wellness Visit  05/18/2017

## 2023-03-30 NOTE — PROGRESS NOTES
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 RTC as scheduled with Dr. Daniel Saenz then in the fall here or   ASAP if there should be any decrease in vision, pain, or any worsening of condition. Subjective findings: The patient return to the clinic today status post partial nail excision and matrixectomy of the left great toe.  The patient is a good spirits.  He had no complaints.  He has not noticed any significant redness, drainage or bleeding.  He has no pain.     Objective findings: The nailbed is clean and dry.  There is no edema or erythema noted.  No drainage noted.  Neurovascular status is intact.     Assessment: Onychocryptosis     Plan: I informed the patient he is healing well without complication.  Applied a Band-Aid over the nailbed.  The patient was instructed to return to the clinic as needed.

## 2023-04-07 DIAGNOSIS — N52.9 ERECTILE DYSFUNCTION, UNSPECIFIED ERECTILE DYSFUNCTION TYPE: ICD-10-CM

## 2023-04-08 RX ORDER — SILDENAFIL CITRATE 20 MG/1
TABLET ORAL
Qty: 40 TABLET | Refills: 5 | Status: SHIPPED | OUTPATIENT
Start: 2023-04-08 | End: 2024-09-16

## 2023-04-09 NOTE — TELEPHONE ENCOUNTER
"Last Written Prescription Date: 12/14/2021  Last Fill Quantity: 40,  # refills: 5   Last office visit provider: 2/14/2023 with PCP Dr HALIE Rangel      Requested Prescriptions   Pending Prescriptions Disp Refills     sildenafil (REVATIO) 20 MG tablet 40 tablet 5     Sig: [SILDENAFIL (REVATIO) 20 MG TABLET] 1-3 BY MOUTH 1 HOUR PRIOR TO SEX AS NEEDED       Erectile Dysfuction Protocol Passed - 4/7/2023  3:28 PM        Passed - Absence of nitrates on medication list        Passed - Absence of Alpha Blockers on Med list        Passed - Recent (12 mo) or future (30 days) visit within the authorizing provider's specialty     Patient has had an office visit with the authorizing provider or a provider within the authorizing providers department within the previous 12 mos or has a future within next 30 days. See \"Patient Info\" tab in inbasket, or \"Choose Columns\" in Meds & Orders section of the refill encounter.              Passed - Medication is active on med list        Passed - Patient is age 18 or older             Deirdre Capone RN 04/08/23 9:44 PM  "

## 2023-06-03 DIAGNOSIS — R03.0 ELEVATED BP WITHOUT DIAGNOSIS OF HYPERTENSION: ICD-10-CM

## 2023-06-04 RX ORDER — LOSARTAN POTASSIUM 50 MG/1
50 TABLET ORAL DAILY
Qty: 90 TABLET | Refills: 2 | Status: SHIPPED | OUTPATIENT
Start: 2023-06-04 | End: 2024-02-20

## 2023-06-04 NOTE — TELEPHONE ENCOUNTER
"Last Written Prescription Date:  2/1/23  Last Fill Quantity: 90,  # refills: 0   Last office visit provider:  2/14/23     Requested Prescriptions   Pending Prescriptions Disp Refills     losartan (COZAAR) 50 MG tablet [Pharmacy Med Name: Losartan Potassium Oral Tablet 50 MG] 90 tablet 0     Sig: Take 1 tablet (50 mg) by mouth daily       Angiotensin-II Receptors Passed - 6/3/2023  6:03 PM        Passed - Last blood pressure under 140/90 in past 12 months     BP Readings from Last 3 Encounters:   02/14/23 129/77   10/13/22 (!) 147/82   02/14/22 120/72                 Passed - Recent (12 mo) or future (30 days) visit within the authorizing provider's specialty     Patient has had an office visit with the authorizing provider or a provider within the authorizing providers department within the previous 12 mos or has a future within next 30 days. See \"Patient Info\" tab in inbasket, or \"Choose Columns\" in Meds & Orders section of the refill encounter.              Passed - Medication is active on med list        Passed - Patient is age 18 or older        Passed - Normal serum creatinine on file in past 12 months     Recent Labs   Lab Test 02/14/23  0836   CR 0.78       Ok to refill medication if creatinine is low          Passed - Normal serum potassium on file in past 12 months     Recent Labs   Lab Test 02/14/23  0836   POTASSIUM 4.5                         Tasha Keller 06/04/23 8:23 AM  "

## 2023-06-14 ENCOUNTER — HOSPITAL ENCOUNTER (OUTPATIENT)
Dept: CT IMAGING | Facility: HOSPITAL | Age: 73
Discharge: HOME | End: 2023-06-14
Attending: FAMILY MEDICINE | Admitting: FAMILY MEDICINE
Payer: COMMERCIAL

## 2023-06-14 DIAGNOSIS — Z87.891 PERSONAL HISTORY OF TOBACCO USE: ICD-10-CM

## 2023-06-14 PROCEDURE — 71271 CT THORAX LUNG CANCER SCR C-: CPT

## 2023-07-18 ENCOUNTER — TRANSFERRED RECORDS (OUTPATIENT)
Dept: HEALTH INFORMATION MANAGEMENT | Facility: CLINIC | Age: 73
End: 2023-07-18
Payer: COMMERCIAL

## 2023-08-13 DIAGNOSIS — Z76.0 ENCOUNTER FOR MEDICATION REFILL: ICD-10-CM

## 2023-08-13 DIAGNOSIS — I70.0 ATHEROSCLEROSIS OF ABDOMINAL AORTA (H): ICD-10-CM

## 2023-08-13 NOTE — TELEPHONE ENCOUNTER
"Routing refill request to provider for review/approval because:  Requesting refill too soon for [facility] to fill    Last Written Prescription Date:  8/29/22  Last Fill Quantity: 90,  # refills: 3   Last office visit provider:  2/14/23     Requested Prescriptions   Pending Prescriptions Disp Refills    atorvastatin (LIPITOR) 40 MG tablet [Pharmacy Med Name: Atorvastatin Calcium Oral Tablet 40 MG] 90 tablet 0     Sig: TAKE ONE TABLET BY MOUTH ONE TIME DAILY       Statins Protocol Passed - 8/13/2023  3:04 AM        Passed - LDL on file in past 12 months     Recent Labs   Lab Test 02/14/23  0836   LDL 65             Passed - No abnormal creatine kinase in past 12 months     No lab results found.             Passed - Recent (12 mo) or future (30 days) visit within the authorizing provider's specialty     Patient has had an office visit with the authorizing provider or a provider within the authorizing providers department within the previous 12 mos or has a future within next 30 days. See \"Patient Info\" tab in inbasket, or \"Choose Columns\" in Meds & Orders section of the refill encounter.              Passed - Medication is active on med list        Passed - Patient is age 18 or older             Mariya Yo RN 08/13/23 3:04 AM  "

## 2023-08-14 RX ORDER — ATORVASTATIN CALCIUM 40 MG/1
TABLET, FILM COATED ORAL
Qty: 90 TABLET | Refills: 3 | Status: SHIPPED | OUTPATIENT
Start: 2023-08-14 | End: 2024-02-20

## 2023-08-16 ENCOUNTER — OFFICE VISIT (OUTPATIENT)
Dept: FAMILY MEDICINE | Facility: CLINIC | Age: 73
End: 2023-08-16
Payer: COMMERCIAL

## 2023-08-16 VITALS
SYSTOLIC BLOOD PRESSURE: 146 MMHG | WEIGHT: 137.5 LBS | RESPIRATION RATE: 18 BRPM | OXYGEN SATURATION: 99 % | HEART RATE: 60 BPM | BODY MASS INDEX: 22.36 KG/M2 | TEMPERATURE: 98.3 F | DIASTOLIC BLOOD PRESSURE: 79 MMHG

## 2023-08-16 DIAGNOSIS — K40.90 UNILATERAL INGUINAL HERNIA WITHOUT OBSTRUCTION OR GANGRENE, RECURRENCE NOT SPECIFIED: Primary | ICD-10-CM

## 2023-08-16 PROCEDURE — 99213 OFFICE O/P EST LOW 20 MIN: CPT | Performed by: NURSE PRACTITIONER

## 2023-08-16 ASSESSMENT — ENCOUNTER SYMPTOMS
VOMITING: 0
ABDOMINAL PAIN: 1
DIARRHEA: 0
NAUSEA: 0

## 2023-08-16 NOTE — PATIENT INSTRUCTIONS
If the hernia comes out, keep pushing the hernia back in.  If you cannot push the hernia in and it is stuck out and/or you are having significant pain and/or the color of the skin over the hernia changes, please go to the nearest emergency room for further evaluation.     Recommend avoidance of heavy lifting.  May wish to take a stool softener such as Colace 1 tablet daily to prevent any sort of need to strain for a bowel movement.     Okay for ibuprofen or Tylenol as needed.    Call when you get home to make an appointment with the surgery clinic to have this repaired.  If they cannot schedule you for sometime next week, I would speak to the triage nurse about your situation to see if you can get in sooner.

## 2023-08-16 NOTE — PROGRESS NOTES
Assessment & Plan     Unilateral inguinal hernia without obstruction or gangrene, recurrence not specified    - Adult General Surg Referral     Patient with inguinal hernia on the right side.  Easily reducible here.  Is pain-free at the moment.  Patient says he is able to reduce the hernia at home and it does relieve the pain.    Explained symptoms of a possible strangulated hernia.     Advised to follow-up with surgery as soon as they are available.  To ER with signs or symptoms of strangulated hernia.    Ibuprofen or Tylenol as needed for more mild pain.    Advised the following:    If the hernia comes out, keep pushing the hernia back in.  If you cannot push the hernia in and it is stuck out and/or you are having significant pain and/or the color of the skin over the hernia changes, please go to the nearest emergency room for further evaluation.     Recommend avoidance of heavy lifting.  May wish to take a stool softener such as Colace 1 tablet daily to prevent any sort of need to strain for a bowel movement.           Return in about 2 days (around 8/18/2023).    [facility]     Rip is a 73 year old male who presents to clinic today for the following health issues:  Chief Complaint   Patient presents with    Abdominal Pain     Possible hernia, lower right side x1 week       Abdominal Pain   Pertinent negatives include diarrhea, nausea and vomiting.       Pain was 7/10 last night rt side near groin.  Kept him awake last night.  Started 10 days ago.  Has been pushing the hernia in which does relieve the pain.     Comes and goes.      No urinary sx.      Was able to see a lump.    Better with ibuprofen this morning.            Review of Systems   Gastrointestinal:  Positive for abdominal pain. Negative for diarrhea, nausea and vomiting.           Objective    BP (!) 146/79 (BP Location: Right arm, Patient Position: Sitting, Cuff Size: Adult  Regular)   Pulse 60   Temp 98.3  F (36.8  C) (Oral)   Resp 18   Wt 62.4 kg (137 lb 8 oz)   SpO2 99%   BMI 22.36 kg/m    Physical Exam  Constitutional:       Appearance: Normal appearance.   Pulmonary:      Effort: Pulmonary effort is normal.   Abdominal:      Hernia: A hernia (Right inguinal hernia.  No color change over the skin.  Easily reducible.) is present.   Neurological:      Mental Status: He is alert.   Psychiatric:         Mood and Affect: Mood normal.

## 2023-08-23 ENCOUNTER — TELEPHONE (OUTPATIENT)
Dept: SURGERY | Facility: CLINIC | Age: 73
End: 2023-08-23

## 2023-08-23 ENCOUNTER — OFFICE VISIT (OUTPATIENT)
Dept: SURGERY | Facility: CLINIC | Age: 73
End: 2023-08-23
Attending: NURSE PRACTITIONER
Payer: COMMERCIAL

## 2023-08-23 DIAGNOSIS — K40.90 UNILATERAL INGUINAL HERNIA WITHOUT OBSTRUCTION OR GANGRENE, RECURRENCE NOT SPECIFIED: ICD-10-CM

## 2023-08-23 PROCEDURE — 99203 OFFICE O/P NEW LOW 30 MIN: CPT | Performed by: SURGERY

## 2023-08-23 NOTE — LETTER
Pre-op Physical: Schedule a pre-op physical with your primary care doctor if not internal to [facility].  If internal, we have scheduled this.   The pre-op physical must be 10-30 days before surgery and since it is required by anesthesia, your surgery will be cancelled if it's not done.      Surgery Date: 9/5/2023     Location: [facility] [address] [id number]    Approximate Arrival Time: 9:00 am  (Unless instructed differently by the pre-op call nurse)     Post op Appointment: 9/20/2023 at 1:30 pm with  Rosalba Bean PA-C . [facility] [phone]       Pre-Surgical Tasks:     Review all medications with your primary care or prescribing physician; they will advise you which meds to stop and when, and when you can resume taking.  Certain medications like blood thinners and weight loss medications need to be stopped in advance of surgery to proceed safely.      Blood thinners including but not exclusive to drugs like Xarelto, Eliquis, Warfarin and Aspirin, should be stopped five days before surgery, if your prescribing provider agrees. Follow your provider's advice on stopping blood thinners because they know you best.  If you are unsure if your medication is a blood thinner, ask your prescribing provider.    Weight loss medications: There are multiple medications being used for weight management and diabetes today, and the list is growing.  Phentermine, Ozempic, Wegovy, Trulicity, and other similar medications need to be stopped one week before surgery to avoid being cancelled.  Victoza and Saxenda can be continued longer but must be stopped one full day before surgery.  Please ask your prescribing provider for advice.    Diabetic medications: in addition to the medications talked about above that are used for either weight loss or diabetes, some people are on insulin that may require adjustment.   Please discuss managing diabetic medications with your prescribing doctor as these medications may require modification prior to surgery.     Please shower the evening before and morning of surgery with Hibiclens soap.  Any [facility] can provide this to you at no cost, or it can be found at your local pharmacy.     Fasting instructions will be provided by the pre-op nurse who will call you 1-3 days before surgery.  Typically, we advise normal food up to 8 hours before you arrive for surgery. Clear liquids only from then until 2 hours before you arrive surgery, then nothing at all by mouth.  The nurse will review your specific instructions with you at the call.      Smoking impacts your body's ability to heal properly so we advise patients to quit if possible before surgery.  Plastic Surgery patients are required to be nicotine free for at least 8 weeks before surgery.      You will need an adult to drive you home and stay with you 24 hours after surgery. Public transportation or Medical Van Services are not permitted.    Visitor restrictions are subject to change, please verify with the pre-op nurse when they call how many people are permitted to accompany you.    We always encourage you to notify your insurance any time you have medical tests or procedures scheduled including surgery. The number is usually right on the back of your insurance card. To obtain pricing for surgery, please call [facility] Cost of Care at [phone] or email [email or web address].        Call our office if you have any questions! Thank you!

## 2023-08-23 NOTE — TELEPHONE ENCOUNTER
Spoke with patient today regarding surgery scheduling     Went over details/instructions.    Surgery Letter sent via [employer]  (Please see LETTERS TAB in chart to retrieve a copy of this letter)

## 2023-08-23 NOTE — PROGRESS NOTES
HPI:  Rip Crockett is a 73 year old male who was referred to me by Lauren Rangel for an inguinal hernia. He  presents today with complaints of right inguinal bulge for several weeks.  He states there is mild discomfort with the bulge and states it is getting larger.  He denies any fevers, chills, nausea or vomiting.      Allergies:Penicillins, Cephalexin, and Cephalosporins    Past Medical History:   Diagnosis Date    Benign essential hypertension 8/16/2021    Sprain and strain of unspecified site of hip and thigh     Created by Conversion        Past Surgical History:   Procedure Laterality Date    ORTHOPEDIC SURGERY         CURRENT MEDS:  Current Outpatient Medications   Medication Sig Dispense Refill    aspirin (ASA) 81 MG EC tablet Take 1 tablet (81 mg) by mouth daily (Patient not taking: Reported on 8/16/2023) 90 tablet 3    atorvastatin (LIPITOR) 40 MG tablet TAKE ONE TABLET BY MOUTH ONE TIME DAILY 90 tablet 3    ibuprofen (ADVIL,MOTRIN) 200 MG tablet [IBUPROFEN (ADVIL,MOTRIN) 200 MG TABLET] Take 200 mg by mouth every 6 (six) hours as needed for pain.      losartan (COZAAR) 50 MG tablet Take 1 tablet (50 mg) by mouth daily 90 tablet 2    sildenafil (REVATIO) 20 MG tablet [SILDENAFIL (REVATIO) 20 MG TABLET] 1-3 BY MOUTH 1 HOUR PRIOR TO SEX AS NEEDED 40 tablet 5       Family history-reviewed and  non-contributory     reports that he has quit smoking. His smoking use included cigarettes. He smoked an average of 1 pack per day. He quit smokeless tobacco use about 7 years ago. He reports current alcohol use. He reports that he does not use drugs.    Review of Systems -   The 12 system review is within normal limits except for as mentioned above.  General ROS: No complaints or constitutional symptoms  Ophthalmic ROS: No complaints of visual changes  Skin: No complaints or symptoms   Endocrine: No complaints or symptoms  Hematologic/Lymphatic: No symptoms or complaints  Psychiatric: No symptoms or  complaints  Respiratory ROS: no cough, shortness of breath, or wheezing  Cardiovascular ROS: no chest pain or dyspnea on exertion  Gastrointestinal ROS: As per HPI  Genito-Urinary ROS: no dysuria, trouble voiding, or hematuria  Musculoskeletal ROS: no joint or muscle pain  Neurological ROS: no TIA or stroke symptoms    There were no vitals taken for this visit.  There is no height or weight on file to calculate BMI.    EXAM:  GENERAL: Well developed male, No acute distress, pleasant and conversant   EYES: Pupils equal, round and reactive, no scleral icterus  ABDOMEN: Right inguinal hernia and reducible, no evidence of a left inguinal hernia  SKIN: Pink, warm and dry, no obvious rashes or lesions   NEURO:No focal deficits, ambulatory  MUSCULOSKELETAL:No obvious deformities, no swelling, normal appearing        Assessment/Plan:   Rip Crockett is a 73 year old male with a right inguinal hernia.  I have discussed the pathophysiology of inguinal hernias at length as well as the  surgical and non-operative management strategies.      The risks of Robotic, Laparoscopic and open inguinal hernia  surgery were explained in detail which include, but are not limited to, bleeding, infection of the mesh, recurrence of the hernia, chronic pain, poor cosmesis, the need for reoperative intervention, the possibility of conversion from a laparoscopic approach to an open approach, subcutaneous emphysema, injury to vital structures,  blood clots, heart attack, stroke and death.  Additionally, the risks of observation were also discussed in detail which include, but are not limited to, chronic pain, enlargement of the hernia, incarceration, strangulation and death.      He understands everything that was discussed and has consented to proceed with surgery.   We will plan on scheduling a open right inguinal hernia repair at his desired date.       Demetri Delarosa D.O. [facility]  [phone]  [facility] Department of Surgery

## 2023-08-23 NOTE — LETTER
8/23/2023         RE: Rip Crockett  [address] [place] [address]        Dear Colleague,    Thank you for referring your patient, Rip Crockett, to the [facility] [place]. Please see a copy of my visit note below.    HPI:  Rip Crockett is a 73 year old male who was referred to me by Lauren Rangel for an inguinal hernia. He  presents today with complaints of right inguinal bulge for several weeks.  He states there is mild discomfort with the bulge and states it is getting larger.  He denies any fevers, chills, nausea or vomiting.      Allergies:Penicillins, Cephalexin, and Cephalosporins    Past Medical History:   Diagnosis Date     Benign essential hypertension 8/16/2021     Sprain and strain of unspecified site of hip and thigh     Created by Conversion        Past Surgical History:   Procedure Laterality Date     ORTHOPEDIC SURGERY         CURRENT MEDS:  Current Outpatient Medications   Medication Sig Dispense Refill     aspirin (ASA) 81 MG EC tablet Take 1 tablet (81 mg) by mouth daily (Patient not taking: Reported on 8/16/2023) 90 tablet 3     atorvastatin (LIPITOR) 40 MG tablet TAKE ONE TABLET BY MOUTH ONE TIME DAILY 90 tablet 3     ibuprofen (ADVIL,MOTRIN) 200 MG tablet [IBUPROFEN (ADVIL,MOTRIN) 200 MG TABLET] Take 200 mg by mouth every 6 (six) hours as needed for pain.       losartan (COZAAR) 50 MG tablet Take 1 tablet (50 mg) by mouth daily 90 tablet 2     sildenafil (REVATIO) 20 MG tablet [SILDENAFIL (REVATIO) 20 MG TABLET] 1-3 BY MOUTH 1 HOUR PRIOR TO SEX AS NEEDED 40 tablet 5       Family history-reviewed and  non-contributory     reports that he has quit smoking. His smoking use included cigarettes. He smoked an average of 1 pack per day. He quit smokeless tobacco use about 7 years ago. He reports current alcohol use. He reports that he does not use drugs.    Review of Systems -   The 12 system review is within normal limits except for as mentioned  above.  General ROS: No complaints or constitutional symptoms  Ophthalmic ROS: No complaints of visual changes  Skin: No complaints or symptoms   Endocrine: No complaints or symptoms  Hematologic/Lymphatic: No symptoms or complaints  Psychiatric: No symptoms or complaints  Respiratory ROS: no cough, shortness of breath, or wheezing  Cardiovascular ROS: no chest pain or dyspnea on exertion  Gastrointestinal ROS: As per HPI  Genito-Urinary ROS: no dysuria, trouble voiding, or hematuria  Musculoskeletal ROS: no joint or muscle pain  Neurological ROS: no TIA or stroke symptoms    There were no vitals taken for this visit.  There is no height or weight on file to calculate BMI.    EXAM:  GENERAL: Well developed male, No acute distress, pleasant and conversant   EYES: Pupils equal, round and reactive, no scleral icterus  ABDOMEN: Right inguinal hernia and reducible, no evidence of a left inguinal hernia  SKIN: Pink, warm and dry, no obvious rashes or lesions   NEURO:No focal deficits, ambulatory  MUSCULOSKELETAL:No obvious deformities, no swelling, normal appearing        Assessment/Plan:   Rip Crockett is a 73 year old male with a right inguinal hernia.  I have discussed the pathophysiology of inguinal hernias at length as well as the  surgical and non-operative management strategies.      The risks of Robotic, Laparoscopic and open inguinal hernia  surgery were explained in detail which include, but are not limited to, bleeding, infection of the mesh, recurrence of the hernia, chronic pain, poor cosmesis, the need for reoperative intervention, the possibility of conversion from a laparoscopic approach to an open approach, subcutaneous emphysema, injury to vital structures,  blood clots, heart attack, stroke and death.  Additionally, the risks of observation were also discussed in detail which include, but are not limited to, chronic pain, enlargement of the hernia, incarceration, strangulation and death.      He  understands everything that was discussed and has consented to proceed with surgery.   We will plan on scheduling a open right inguinal hernia repair at his desired date.       Demetri Delarosa D.O. FACS  [phone]  [facility] Department of Surgery      Again, thank you for allowing me to participate in the care of your patient.        Sincerely,        Greg Delarosa, DO

## 2023-08-30 ENCOUNTER — OFFICE VISIT (OUTPATIENT)
Dept: INTERNAL MEDICINE | Facility: CLINIC | Age: 73
End: 2023-08-30
Payer: COMMERCIAL

## 2023-08-30 VITALS
DIASTOLIC BLOOD PRESSURE: 74 MMHG | TEMPERATURE: 97.6 F | HEIGHT: 66 IN | SYSTOLIC BLOOD PRESSURE: 124 MMHG | HEART RATE: 60 BPM | WEIGHT: 139 LBS | BODY MASS INDEX: 22.34 KG/M2 | RESPIRATION RATE: 16 BRPM

## 2023-08-30 DIAGNOSIS — K40.90 UNILATERAL INGUINAL HERNIA WITHOUT OBSTRUCTION OR GANGRENE, RECURRENCE NOT SPECIFIED: ICD-10-CM

## 2023-08-30 DIAGNOSIS — Z01.818 PRE-OP EXAM: Primary | ICD-10-CM

## 2023-08-30 DIAGNOSIS — I10 BENIGN ESSENTIAL HYPERTENSION: ICD-10-CM

## 2023-08-30 DIAGNOSIS — I70.0 ATHEROSCLEROSIS OF ABDOMINAL AORTA (H): ICD-10-CM

## 2023-08-30 LAB
ATRIAL RATE - MUSE: 59 BPM
DIASTOLIC BLOOD PRESSURE - MUSE: NORMAL MMHG
HGB BLD-MCNC: 12.4 G/DL (ref 13.3–17.7)
INTERPRETATION ECG - MUSE: NORMAL
P AXIS - MUSE: 26 DEGREES
POTASSIUM SERPL-SCNC: 4.5 MMOL/L (ref 3.4–5.3)
PR INTERVAL - MUSE: 106 MS
QRS DURATION - MUSE: 120 MS
QT - MUSE: 418 MS
QTC - MUSE: 413 MS
R AXIS - MUSE: -12 DEGREES
SYSTOLIC BLOOD PRESSURE - MUSE: NORMAL MMHG
T AXIS - MUSE: 40 DEGREES
VENTRICULAR RATE- MUSE: 59 BPM

## 2023-08-30 PROCEDURE — 84132 ASSAY OF SERUM POTASSIUM: CPT | Performed by: NURSE PRACTITIONER

## 2023-08-30 PROCEDURE — 36415 COLL VENOUS BLD VENIPUNCTURE: CPT | Performed by: NURSE PRACTITIONER

## 2023-08-30 PROCEDURE — 93005 ELECTROCARDIOGRAM TRACING: CPT | Performed by: NURSE PRACTITIONER

## 2023-08-30 PROCEDURE — 93010 ELECTROCARDIOGRAM REPORT: CPT | Performed by: INTERNAL MEDICINE

## 2023-08-30 PROCEDURE — 85018 HEMOGLOBIN: CPT | Performed by: NURSE PRACTITIONER

## 2023-08-30 PROCEDURE — 99214 OFFICE O/P EST MOD 30 MIN: CPT | Performed by: NURSE PRACTITIONER

## 2023-08-30 NOTE — H&P (VIEW-ONLY)
[facility]  [address] [address]  Phone: [phone]  Fax: [phone]  Primary Provider: Lauren Rangel  Pre-op Performing Provider: CHARISSA GRAFF      PREOPERATIVE EVALUATION:  Today's date: 8/30/2023    Rip Crockett is a 73 year old male who presents for a preoperative evaluation.      8/30/2023     8:49 AM   Additional Questions   Roomed by        Surgical Information:  Surgery/Procedure: HERNIORRHAPHY, INGUINAL, OPEN RIGHT  Surgery Location:   Surgeon: Dr. Delarosa  Surgery Date: 9/5/23  Time of Surgery: 10:30AM  Where patient plans to recover: At home with family  Fax number for surgical facility: Note does not need to be faxed, will be available electronically in Epic.    Assessment & Plan     The proposed surgical procedure is considered LOW risk.    Pre-op exam  No contraindications for planned procedure  EKG personally interpreted as sinus bradycardia with no ST changes  - EKG 12-lead, tracing only    Unilateral inguinal hernia without obstruction or gangrene, recurrence not specified  Hernia surgery for definitive treatment    Benign essential hypertension  Controlled on losartan.  Recheck potassium    Atherosclerosis of abdominal aorta (H)  Continues on atorvastatin.  Not on a baby aspirin.  No history of CVA or MI    Patient Instructions   Hold all supplements, aspirin and NSAIDs for 7 days prior to surgery.     Follow your surgeon's direction on when to stop eating and drinking prior to surgery.    Your surgeon will be managing your pain after your surgery.      Remove all jewelry and metal piercings before your surgery.     Remove nail polish from fingers before surgery.    If you use a CPAP machine, bring this with you to surgery.              - No identified additional risk factors other than previously addressed    Antiplatelet or Anticoagulation Medication Instructions:   - Patient is on no antiplatelet or anticoagulation  medications.    Additional Medication Instructions:  Patient is to take all scheduled medications on the day of surgery    RECOMMENDATION:  APPROVAL GIVEN to proceed with proposed procedure, without further diagnostic evaluation.  }    Subjective       HPI related to upcoming procedure: As above        8/27/2023    12:14 PM   Preop Questions   1. Have you ever had a heart attack or stroke? No   2. Have you ever had surgery on your heart or blood vessels, such as a stent placement, a coronary artery bypass, or surgery on an artery in your head, neck, heart, or legs? No   3. Do you have chest pain with activity? No   4. Do you have a history of  heart failure? No   5. Do you currently have a cold, bronchitis or symptoms of other infection? No   6. Do you have a cough, shortness of breath, or wheezing? No   7. Do you or anyone in your family have previous history of blood clots? No   8. Do you or does anyone in your family have a serious bleeding problem such as prolonged bleeding following surgeries or cuts? No   9. Have you ever had problems with anemia or been told to take iron pills? No   10. Have you had any abnormal blood loss such as black, tarry or bloody stools? No   11. Have you ever had a blood transfusion? No   12. Are you willing to have a blood transfusion if it is medically needed before, during, or after your surgery? Yes   13. Have you or any of your relatives ever had problems with anesthesia? No   14. Do you have sleep apnea, excessive snoring or daytime drowsiness? No   15. Do you have any artifical heart valves or other implanted medical devices like a pacemaker, defibrillator, or continuous glucose monitor? No   16. Do you have artificial joints? No   17. Are you allergic to latex? No       Health Care Directive:  Patient has a Health Care Directive on file      Preoperative Review of :   reviewed - no record of controlled substances prescribed.      Status of Chronic Conditions:  See  problem list for active medical problems.  Problems all longstanding and stable, except as noted/documented.  See ROS for pertinent symptoms related to these conditions.    Review of Systems  CONSTITUTIONAL: NEGATIVE for fever, chills, change in weight  INTEGUMENTARY/SKIN: NEGATIVE for worrisome rashes, moles or lesions  EYES: NEGATIVE for vision changes or irritation  ENT/MOUTH: NEGATIVE for ear, mouth and throat problems  RESP: NEGATIVE for significant cough or SOB  CV: NEGATIVE for chest pain, palpitations or peripheral edema  GI: NEGATIVE for nausea, abdominal pain, heartburn, or change in bowel habits  : NEGATIVE for frequency, dysuria, or hematuria  MUSCULOSKELETAL: NEGATIVE for significant arthralgias or myalgia  NEURO: NEGATIVE for weakness, dizziness or paresthesias  ENDOCRINE: NEGATIVE for temperature intolerance, skin/hair changes  HEME: NEGATIVE for bleeding problems  PSYCHIATRIC: NEGATIVE for changes in mood or affect    Patient Active Problem List    Diagnosis Date Noted    Abdominal aortic aneurysm (AAA) 30 to 34 mm in diameter 02/24/2022     Priority: Medium     2/2022 per recommendation by radiology recheck in 3 years 2/2025    IMPRESSION:  1.  Abdominal aortic aneurysm which measures 3.1 cm upper abdomen and 3.3 x 3.4 cm infrarenal abdominal aorta with extensive atheromatous plaque.     REFERENCE:  [facility]S practice guidelines on the care of patients with an abdominal aortic aneurysm. Mark EL. J Vasc Surg, 2018. PMID: [id number].     Aorta size: 3.0 cm to 3.9 cm: Surveillance imaging at 3-year intervals.      Atherosclerosis of abdominal aorta (H) 02/24/2022     Priority: Medium    Benign essential hypertension 08/16/2021     Priority: Medium    Pulmonary emphysema, unspecified emphysema type (H) 08/16/2021     Priority: Medium     on CT lung  5/2021       Former smoker 08/16/2021     Priority: Medium    Erectile dysfunction, unspecified erectile dysfunction type 04/20/2021     Priority: Medium     Carpal tunnel syndrome of left wrist 06/19/2020     Priority: Medium    Nicotine dependence 06/16/2020     Priority: Medium    Adenomatous colon polyp 07/16/2018     Priority: Medium     Colonoscopy 7/2018, recheck in 5 years        Past Medical History:   Diagnosis Date    Benign essential hypertension 8/16/2021    Sprain and strain of unspecified site of hip and thigh     Created by Conversion      Past Surgical History:   Procedure Laterality Date    ORTHOPEDIC SURGERY       Current Outpatient Medications   Medication Sig Dispense Refill    atorvastatin (LIPITOR) 40 MG tablet TAKE ONE TABLET BY MOUTH ONE TIME DAILY 90 tablet 3    ibuprofen (ADVIL,MOTRIN) 200 MG tablet [IBUPROFEN (ADVIL,MOTRIN) 200 MG TABLET] Take 200 mg by mouth every 6 (six) hours as needed for pain.      losartan (COZAAR) 50 MG tablet Take 1 tablet (50 mg) by mouth daily 90 tablet 2    sildenafil (REVATIO) 20 MG tablet [SILDENAFIL (REVATIO) 20 MG TABLET] 1-3 BY MOUTH 1 HOUR PRIOR TO SEX AS NEEDED 40 tablet 5    aspirin (ASA) 81 MG EC tablet Take 1 tablet (81 mg) by mouth daily (Patient not taking: Reported on 8/16/2023) 90 tablet 3       Allergies   Allergen Reactions    Penicillins     Cephalexin Hives and Rash    Cephalosporins Rash        Social History     Tobacco Use    Smoking status: Former     Packs/day: 1.00     Types: Cigarettes    Smokeless tobacco: Former     Quit date: 6/10/2016    Tobacco comments:     smoking since age 20 years old per pt  NO VAPING   Substance Use Topics    Alcohol use: Yes     Comment: Alcoholic Drinks/day: 3-5 beers per day     Family History   Problem Relation Age of Onset    No Known Problems Mother     No Known Problems Father     No Known Problems Maternal Grandmother     No Known Problems Maternal Grandfather     No Known Problems Paternal Grandmother     No Known Problems Paternal Grandfather     No Known Problems Sister     No Known Problems Brother     No Known Problems Maternal Half-Brother     No  "Known Problems Maternal Half-Sister     No Known Problems Paternal Half-Brother     No Known Problems Paternal Half-Sister     No Known Problems Daughter     No Known Problems Son     No Known Problems Maternal Aunt     No Known Problems Maternal Uncle     No Known Problems Paternal Aunt     No Known Problems Paternal Uncle     No Known Problems Cousin     No Known Problems Other      History   Drug Use No         Objective     /74 (BP Location: Right arm, Patient Position: Sitting, Cuff Size: Adult Regular)   Pulse 60   Temp 97.6  F (36.4  C) (Oral)   Resp 16   Ht 1.67 m (5' 5.75\")   Wt 63 kg (139 lb)   BMI 22.61 kg/m      Physical Exam    GENERAL APPEARANCE: healthy, alert and no distress     EYES: EOMI,  PERRL     HENT: ear canals and TM's normal and nose and mouth without ulcers or lesions     NECK: no adenopathy, no asymmetry, masses, or scars and thyroid normal to palpation     RESP: lungs clear to auscultation - no rales, rhonchi or wheezes     CV: regular rates and rhythm, normal S1 S2, no S3 or S4 and no murmur, click or rub     ABDOMEN:  soft, nontender, no HSM or masses and bowel sounds normal     MS: extremities normal- no gross deformities noted, no evidence of inflammation in joints, FROM in all extremities.     SKIN: no suspicious lesions or rashes     NEURO: Normal strength and tone, sensory exam grossly normal, mentation intact and speech normal     PSYCH: mentation appears normal. and affect normal/bright     LYMPHATICS: No cervical adenopathy    Recent Labs   Lab Test 02/14/23  0836 10/13/22  1036 02/14/22  0947   HGB  --   --  13.2*   PLT  --   --  254   * 133* 132*   POTASSIUM 4.5 4.3 4.0   CR 0.78 0.81 0.78        Diagnostics:  Labs pending at this time.  Results will be reviewed when available.       Revised Cardiac Risk Index (RCRI):  The patient has the following serious cardiovascular risks for perioperative complications:   - No serious cardiac risks = 0 points     RCRI " Interpretation: 1 point: Class II (low risk - 0.9% complication rate)         Signed Electronically by: Terrell Francis CNP  Copy of this evaluation report is provided to requesting physician.

## 2023-09-04 ENCOUNTER — ANESTHESIA EVENT (OUTPATIENT)
Dept: SURGERY | Facility: CLINIC | Age: 73
End: 2023-09-04
Payer: COMMERCIAL

## 2023-09-05 ENCOUNTER — ANESTHESIA (OUTPATIENT)
Dept: SURGERY | Facility: CLINIC | Age: 73
End: 2023-09-05
Payer: COMMERCIAL

## 2023-09-05 ENCOUNTER — HOSPITAL ENCOUNTER (OUTPATIENT)
Facility: CLINIC | Age: 73
Discharge: HOME | End: 2023-09-05
Attending: SURGERY | Admitting: SURGERY
Payer: COMMERCIAL

## 2023-09-05 VITALS
DIASTOLIC BLOOD PRESSURE: 69 MMHG | RESPIRATION RATE: 18 BRPM | HEART RATE: 60 BPM | TEMPERATURE: 98.5 F | WEIGHT: 140 LBS | SYSTOLIC BLOOD PRESSURE: 108 MMHG | HEIGHT: 65 IN | BODY MASS INDEX: 23.32 KG/M2 | OXYGEN SATURATION: 97 %

## 2023-09-05 DIAGNOSIS — K40.90 NON-RECURRENT UNILATERAL INGUINAL HERNIA WITHOUT OBSTRUCTION OR GANGRENE: Primary | ICD-10-CM

## 2023-09-05 PROCEDURE — 999N000141 HC STATISTIC PRE-PROCEDURE NURSING ASSESSMENT: Performed by: SURGERY

## 2023-09-05 PROCEDURE — 250N000011 HC RX IP 250 OP 636: Mod: JZ | Performed by: ANESTHESIOLOGY

## 2023-09-05 PROCEDURE — 250N000009 HC RX 250: Performed by: ANESTHESIOLOGY

## 2023-09-05 PROCEDURE — 258N000003 HC RX IP 258 OP 636: Performed by: ANESTHESIOLOGY

## 2023-09-05 PROCEDURE — 49505 PRP I/HERN INIT REDUC >5 YR: CPT | Mod: RT | Performed by: SURGERY

## 2023-09-05 PROCEDURE — 250N000011 HC RX IP 250 OP 636: Performed by: SURGERY

## 2023-09-05 PROCEDURE — 272N000001 HC OR GENERAL SUPPLY STERILE: Performed by: SURGERY

## 2023-09-05 PROCEDURE — C1781 MESH (IMPLANTABLE): HCPCS | Performed by: SURGERY

## 2023-09-05 PROCEDURE — 370N000017 HC ANESTHESIA TECHNICAL FEE, PER MIN: Performed by: SURGERY

## 2023-09-05 PROCEDURE — 250N000009 HC RX 250: Performed by: SURGERY

## 2023-09-05 PROCEDURE — 710N000012 HC RECOVERY PHASE 2, PER MINUTE: Performed by: SURGERY

## 2023-09-05 PROCEDURE — 360N000075 HC SURGERY LEVEL 2, PER MIN: Performed by: SURGERY

## 2023-09-05 PROCEDURE — 250N000013 HC RX MED GY IP 250 OP 250 PS 637: Performed by: ANESTHESIOLOGY

## 2023-09-05 DEVICE — MESH PROGRIP 4.7X3" PARIETEX RIGHT TEM1208GR: Type: IMPLANTABLE DEVICE | Site: ABDOMEN | Status: FUNCTIONAL

## 2023-09-05 DEVICE — IMPLANTABLE DEVICE: Type: IMPLANTABLE DEVICE | Site: ABDOMEN | Status: FUNCTIONAL

## 2023-09-05 RX ORDER — ONDANSETRON 2 MG/ML
4 INJECTION INTRAMUSCULAR; INTRAVENOUS EVERY 30 MIN PRN
Status: DISCONTINUED | OUTPATIENT
Start: 2023-09-05 | End: 2023-09-05 | Stop reason: HOSPADM

## 2023-09-05 RX ORDER — NALOXONE HYDROCHLORIDE 0.4 MG/ML
0.2 INJECTION, SOLUTION INTRAMUSCULAR; INTRAVENOUS; SUBCUTANEOUS
Status: DISCONTINUED | OUTPATIENT
Start: 2023-09-05 | End: 2023-09-05 | Stop reason: HOSPADM

## 2023-09-05 RX ORDER — HYDROCODONE BITARTRATE AND ACETAMINOPHEN 5; 325 MG/1; MG/1
1 TABLET ORAL
Status: DISCONTINUED | OUTPATIENT
Start: 2023-09-05 | End: 2023-09-05 | Stop reason: HOSPADM

## 2023-09-05 RX ORDER — BUPIVACAINE HYDROCHLORIDE AND EPINEPHRINE 2.5; 5 MG/ML; UG/ML
INJECTION, SOLUTION EPIDURAL; INFILTRATION; INTRACAUDAL; PERINEURAL PRN
Status: DISCONTINUED | OUTPATIENT
Start: 2023-09-05 | End: 2023-09-05 | Stop reason: HOSPADM

## 2023-09-05 RX ORDER — DEXAMETHASONE SODIUM PHOSPHATE 10 MG/ML
INJECTION, SOLUTION INTRAMUSCULAR; INTRAVENOUS PRN
Status: DISCONTINUED | OUTPATIENT
Start: 2023-09-05 | End: 2023-09-05

## 2023-09-05 RX ORDER — ONDANSETRON 2 MG/ML
INJECTION INTRAMUSCULAR; INTRAVENOUS PRN
Status: DISCONTINUED | OUTPATIENT
Start: 2023-09-05 | End: 2023-09-05

## 2023-09-05 RX ORDER — SODIUM CHLORIDE, SODIUM LACTATE, POTASSIUM CHLORIDE, CALCIUM CHLORIDE 600; 310; 30; 20 MG/100ML; MG/100ML; MG/100ML; MG/100ML
INJECTION, SOLUTION INTRAVENOUS CONTINUOUS
Status: DISCONTINUED | OUTPATIENT
Start: 2023-09-05 | End: 2023-09-05 | Stop reason: HOSPADM

## 2023-09-05 RX ORDER — HYDROCODONE BITARTRATE AND ACETAMINOPHEN 5; 325 MG/1; MG/1
1-2 TABLET ORAL EVERY 4 HOURS PRN
Qty: 15 TABLET | Refills: 0 | Status: SHIPPED | OUTPATIENT
Start: 2023-09-05

## 2023-09-05 RX ORDER — CLINDAMYCIN PHOSPHATE 900 MG/50ML
900 INJECTION, SOLUTION INTRAVENOUS SEE ADMIN INSTRUCTIONS
Status: DISCONTINUED | OUTPATIENT
Start: 2023-09-05 | End: 2023-09-05 | Stop reason: HOSPADM

## 2023-09-05 RX ORDER — PROPOFOL 10 MG/ML
INJECTION, EMULSION INTRAVENOUS CONTINUOUS PRN
Status: DISCONTINUED | OUTPATIENT
Start: 2023-09-05 | End: 2023-09-05

## 2023-09-05 RX ORDER — NALOXONE HYDROCHLORIDE 0.4 MG/ML
0.4 INJECTION, SOLUTION INTRAMUSCULAR; INTRAVENOUS; SUBCUTANEOUS
Status: DISCONTINUED | OUTPATIENT
Start: 2023-09-05 | End: 2023-09-05 | Stop reason: HOSPADM

## 2023-09-05 RX ORDER — PROPOFOL 10 MG/ML
INJECTION, EMULSION INTRAVENOUS PRN
Status: DISCONTINUED | OUTPATIENT
Start: 2023-09-05 | End: 2023-09-05

## 2023-09-05 RX ORDER — OXYCODONE HYDROCHLORIDE 5 MG/1
5 TABLET ORAL
Status: COMPLETED | OUTPATIENT
Start: 2023-09-05 | End: 2023-09-05

## 2023-09-05 RX ORDER — ONDANSETRON 4 MG/1
4 TABLET, ORALLY DISINTEGRATING ORAL EVERY 30 MIN PRN
Status: DISCONTINUED | OUTPATIENT
Start: 2023-09-05 | End: 2023-09-05 | Stop reason: HOSPADM

## 2023-09-05 RX ORDER — DOCUSATE SODIUM 100 MG/1
100 CAPSULE, LIQUID FILLED ORAL 2 TIMES DAILY
Qty: 30 CAPSULE | Refills: 0 | Status: SHIPPED | OUTPATIENT
Start: 2023-09-05

## 2023-09-05 RX ORDER — LIDOCAINE HYDROCHLORIDE 10 MG/ML
INJECTION, SOLUTION INFILTRATION; PERINEURAL PRN
Status: DISCONTINUED | OUTPATIENT
Start: 2023-09-05 | End: 2023-09-05

## 2023-09-05 RX ORDER — FENTANYL CITRATE 50 UG/ML
25 INJECTION, SOLUTION INTRAMUSCULAR; INTRAVENOUS
Status: DISCONTINUED | OUTPATIENT
Start: 2023-09-05 | End: 2023-09-05 | Stop reason: HOSPADM

## 2023-09-05 RX ORDER — CLINDAMYCIN PHOSPHATE 900 MG/50ML
900 INJECTION, SOLUTION INTRAVENOUS
Status: COMPLETED | OUTPATIENT
Start: 2023-09-05 | End: 2023-09-05

## 2023-09-05 RX ORDER — HALOPERIDOL 5 MG/ML
1 INJECTION INTRAMUSCULAR
Status: DISCONTINUED | OUTPATIENT
Start: 2023-09-05 | End: 2023-09-05 | Stop reason: HOSPADM

## 2023-09-05 RX ORDER — LIDOCAINE 40 MG/G
CREAM TOPICAL
Status: DISCONTINUED | OUTPATIENT
Start: 2023-09-05 | End: 2023-09-05 | Stop reason: HOSPADM

## 2023-09-05 RX ORDER — GLYCOPYRROLATE 0.2 MG/ML
INJECTION, SOLUTION INTRAMUSCULAR; INTRAVENOUS PRN
Status: DISCONTINUED | OUTPATIENT
Start: 2023-09-05 | End: 2023-09-05

## 2023-09-05 RX ORDER — ACETAMINOPHEN 325 MG/1
975 TABLET ORAL ONCE
Status: COMPLETED | OUTPATIENT
Start: 2023-09-05 | End: 2023-09-05

## 2023-09-05 RX ORDER — FENTANYL CITRATE 50 UG/ML
INJECTION, SOLUTION INTRAMUSCULAR; INTRAVENOUS PRN
Status: DISCONTINUED | OUTPATIENT
Start: 2023-09-05 | End: 2023-09-05

## 2023-09-05 RX ADMIN — SODIUM CHLORIDE, POTASSIUM CHLORIDE, SODIUM LACTATE AND CALCIUM CHLORIDE: 600; 310; 30; 20 INJECTION, SOLUTION INTRAVENOUS at 09:35

## 2023-09-05 RX ADMIN — ONDANSETRON 4 MG: 2 INJECTION INTRAMUSCULAR; INTRAVENOUS at 11:08

## 2023-09-05 RX ADMIN — DEXMEDETOMIDINE HYDROCHLORIDE 8 MCG: 100 INJECTION, SOLUTION INTRAVENOUS at 11:00

## 2023-09-05 RX ADMIN — LIDOCAINE HYDROCHLORIDE 4 ML: 10 INJECTION, SOLUTION INFILTRATION; PERINEURAL at 11:08

## 2023-09-05 RX ADMIN — CLINDAMYCIN PHOSPHATE 900 MG: 900 INJECTION, SOLUTION INTRAVENOUS at 09:35

## 2023-09-05 RX ADMIN — OXYCODONE HYDROCHLORIDE 5 MG: 5 TABLET ORAL at 12:07

## 2023-09-05 RX ADMIN — DEXAMETHASONE SODIUM PHOSPHATE 10 MG: 10 INJECTION, SOLUTION INTRAMUSCULAR; INTRAVENOUS at 11:15

## 2023-09-05 RX ADMIN — FENTANYL CITRATE 25 MCG: 50 INJECTION, SOLUTION INTRAMUSCULAR; INTRAVENOUS at 11:22

## 2023-09-05 RX ADMIN — FENTANYL CITRATE 25 MCG: 50 INJECTION, SOLUTION INTRAMUSCULAR; INTRAVENOUS at 11:43

## 2023-09-05 RX ADMIN — PROPOFOL 20 MG: 10 INJECTION, EMULSION INTRAVENOUS at 11:11

## 2023-09-05 RX ADMIN — PHENYLEPHRINE HYDROCHLORIDE 200 MCG: 10 INJECTION INTRAVENOUS at 11:37

## 2023-09-05 RX ADMIN — GLYCOPYRROLATE 0.1 MG: 0.2 INJECTION INTRAMUSCULAR; INTRAVENOUS at 11:00

## 2023-09-05 RX ADMIN — ACETAMINOPHEN 975 MG: 325 TABLET ORAL at 09:24

## 2023-09-05 RX ADMIN — FENTANYL CITRATE 50 MCG: 50 INJECTION, SOLUTION INTRAMUSCULAR; INTRAVENOUS at 11:00

## 2023-09-05 RX ADMIN — DEXMEDETOMIDINE HYDROCHLORIDE 8 MCG: 100 INJECTION, SOLUTION INTRAVENOUS at 11:08

## 2023-09-05 RX ADMIN — PROPOFOL 150 MCG/KG/MIN: 10 INJECTION, EMULSION INTRAVENOUS at 11:08

## 2023-09-05 RX ADMIN — GLYCOPYRROLATE 0.1 MG: 0.2 INJECTION INTRAMUSCULAR; INTRAVENOUS at 11:08

## 2023-09-05 ASSESSMENT — ACTIVITIES OF DAILY LIVING (ADL)
ADLS_ACUITY_SCORE: 35
ADLS_ACUITY_SCORE: 33
ADLS_ACUITY_SCORE: 35

## 2023-09-05 ASSESSMENT — LIFESTYLE VARIABLES: TOBACCO_USE: 1

## 2023-09-05 NOTE — DISCHARGE INSTRUCTIONS
Sedation: Care Instructions  Overview     Sedation is the use of medicine to help you feel relaxed and comfortable during a procedure. The medicine is usually given in a vein (by IV). It may be used with numbing medicines.  There are different levels of sedation. They range from being awake but relaxed to being completely unconscious. Which level you have will depend on the procedure and your needs. You will be watched closely by a doctor or nurse during sedation.  Common side effects from sedation include:  Feeling sleepy or tired. (Your doctors and nurses will make sure you aren't too sleepy to go home.)  Feeling dizzy or unsteady.  Follow-up care is a key part of your treatment and safety. Be sure to make and go to all appointments, and call your doctor if you are having problems. It's also a good idea to know your test results and keep a list of the medicines you take.  How can you care for yourself at home?  Activity    Don't do anything that requires attention to detail until you recover. This includes going to work or school, making important decisions, and signing any legal documents. It takes time for the medicine effects to completely wear off.     For at least 24 hours, do not drive or operate any machinery.     When you get home, make sure to rest until the anesthesia has worn off. Some people will feel drowsy or dizzy for up to a few hours after leaving the hospital.     Take your time and walk slowly. Sudden changes in position may cause nausea.     Rest when you feel tired. Getting enough sleep will help you recover.     If you have sleep apnea and you have a CPAP machine, be sure to use it.   Diet    Don't drink alcohol for 24 hours.     You can eat your normal diet, unless your doctor gives you other instructions. If your stomach is upset, try clear liquids and bland, low-fat foods like plain toast or rice.     Drink plenty of fluids (unless your doctor tells you not to).   When should you call for  "help?   Call 911 anytime you think you may need emergency care. For example, call if:    You have trouble breathing.     You passed out (lost consciousness).   Call your doctor now or seek immediate medical care if:    You have nausea or vomiting that gets worse or won't stop.     You have a fever.     You have a new or worse headache.     The medicine is not wearing off and you can't think clearly.   Watch closely for changes in your health, and be sure to contact your doctor if:    You do not get better as expected.   Where can you learn more?  Go to https://www.[employer].net/patiented  Enter G817 in the search box to learn more about \"Sedation: Care Instructions.\"  Current as of: October 20, 2022               Content Version: 13.7    [id number] [employer].   Care instructions adapted under license by your healthcare professional. If you have questions about a medical condition or this instruction, always ask your healthcare professional. [employer] disclaims any warranty or liability for your use of this information.      "

## 2023-09-05 NOTE — ANESTHESIA CARE TRANSFER NOTE
Patient: Rip Crockett    Procedure: Procedure(s):  HERNIORRHAPHY, INGUINAL, OPEN       Diagnosis: Unilateral inguinal hernia without obstruction or gangrene, recurrence not specified [K40.90]  Diagnosis Additional Information: No value filed.    Anesthesia Type:   MAC     Note:    Oropharynx: oropharynx clear of all foreign objects  Level of Consciousness: drowsy  Oxygen Supplementation: room air    Independent Airway: airway patency satisfactory and stable  Dentition: dentition unchanged  Vital Signs Stable: post-procedure vital signs reviewed and stable  Report to RN Given: handoff report given  Patient transferred to: Phase II    Handoff Report: Identifed the Patient, Identified the Reponsible Provider, Reviewed the pertinent medical history, Discussed the surgical course, Reviewed Intra-OP anesthesia mangement and issues during anesthesia, Set expectations for post-procedure period and Allowed opportunity for questions and acknowledgement of understanding      Vitals:  Vitals Value Taken Time   /62 09/05/23 1153   Temp 37.6  C (99.7  F) 09/05/23 1152   Pulse 71 09/05/23 1154   Resp 16 09/05/23 1152   SpO2 99 % 09/05/23 1154   Vitals shown include unvalidated device data.    Electronically Signed By: ANGELITA YBARRA CRNA  September 5, 2023  11:55 AM

## 2023-09-05 NOTE — INTERVAL H&P NOTE
I have reviewed the surgical (or preoperative) H&P that is linked to this encounter, and examined the patient. There are no significant changes    Plan for Procedure(s):  HERNIORRHAPHY, INGUINAL, OPEN - right    Demetri Delarosa [facility]  [phone]

## 2023-09-05 NOTE — ANESTHESIA POSTPROCEDURE EVALUATION
Patient: Rip Crockett    Procedure: Procedure(s):  HERNIORRHAPHY, INGUINAL, OPEN       Anesthesia Type:  MAC    Note:  Disposition: Outpatient   Postop Pain Control: Uneventful            Sign Out: Well controlled pain   PONV: No   Neuro/Psych: Uneventful            Sign Out: Acceptable/Baseline neuro status   Airway/Respiratory: Uneventful            Sign Out: Acceptable/Baseline resp. status   CV/Hemodynamics: Uneventful            Sign Out: Acceptable CV status; No obvious hypovolemia; No obvious fluid overload   Other NRE: NONE   DID A NON-ROUTINE EVENT OCCUR? No           Last vitals:  Vitals Value Taken Time   /67 09/05/23 1230   Temp 37.6  C (99.7  F) 09/05/23 1152   Pulse 68 09/05/23 1233   Resp 16 09/05/23 1200   SpO2 98 % 09/05/23 1233   Vitals shown include unvalidated device data.    Electronically Signed By: JAQUELINE HARPER MD  September 5, 2023  12:35 PM

## 2023-09-05 NOTE — PHARMACY-ADMISSION MEDICATION HISTORY
Pharmacist Admission Medication History    Admission medication history is complete. The information provided in this note is only as accurate as the sources available at the time of the update.    Medication reconciliation/reorder completed by provider prior to medication history? No    Information Source(s): Patient and CareEverywhere/SureScripts via in-person    Pertinent Information:      Changes made to PTA medication list:  Added: None  Deleted: None  Changed: None    Medication Affordability:  Not including over the counter (OTC) medications, was there a time in the past 3 months when you did not take your medications as prescribed because of cost?: No    Allergies reviewed with patient and updates made in EHR: yes    Medication History Completed By: Onur Jin RPH 9/5/2023 9:13 AM    Prior to Admission medications    Medication Sig Last Dose Taking? Auth Provider Long Term End Date   atorvastatin (LIPITOR) 40 MG tablet TAKE ONE TABLET BY MOUTH ONE TIME DAILY 9/4/2023 Yes Lauren Rangel MD Yes    ibuprofen (ADVIL,MOTRIN) 200 MG tablet [IBUPROFEN (ADVIL,MOTRIN) 200 MG TABLET] Take 200 mg by mouth every 6 (six) hours as needed for pain. 9/3/2023 Yes Provider, Historical     losartan (COZAAR) 50 MG tablet Take 1 tablet (50 mg) by mouth daily 9/4/2023 Yes Lauren Rangel MD Yes    sildenafil (REVATIO) 20 MG tablet [SILDENAFIL (REVATIO) 20 MG TABLET] 1-3 BY MOUTH 1 HOUR PRIOR TO SEX AS NEEDED Past Month Yes Lauren Rangel MD Yes

## 2023-09-05 NOTE — ANESTHESIA PREPROCEDURE EVALUATION
Anesthesia Pre-Procedure Evaluation    Patient: Rip Crockett   MRN: [id number] : 1950        Procedure : Procedure(s):  HERNIORRHAPHY, INGUINAL, OPEN          Past Medical History:   Diagnosis Date    Benign essential hypertension 2021    Sprain and strain of unspecified site of hip and thigh     Created by Conversion       Past Surgical History:   Procedure Laterality Date    ORTHOPEDIC SURGERY        Allergies   Allergen Reactions    Penicillins     Cephalexin Hives and Rash    Cephalosporins Rash      Social History     Tobacco Use    Smoking status: Former     Packs/day: 1.00     Types: Cigarettes    Smokeless tobacco: Former     Quit date: 6/10/2016    Tobacco comments:     smoking since age 20 years old per pt  NO VAPING   Substance Use Topics    Alcohol use: Yes     Comment: Alcoholic Drinks/day: 3-5 beers per day      Wt Readings from Last 1 Encounters:   23 63.5 kg (140 lb)        Anesthesia Evaluation   Pt has had prior anesthetic.     No history of anesthetic complications       ROS/MED HX  ENT/Pulmonary:     (+)                tobacco use, Past use,                      Neurologic:    (-) no CVA   Cardiovascular:     (+) Dyslipidemia hypertension- Peripheral Vascular Disease-   -  - -                                      METS/Exercise Tolerance:     Hematologic:     (+)      anemia (Hgb 12.4),          Musculoskeletal:  - neg musculoskeletal ROS     GI/Hepatic:  - neg GI/hepatic ROS     Renal/Genitourinary:  - neg Renal ROS     Endo:  - neg endo ROS     Psychiatric/Substance Use:  - neg psychiatric ROS     Infectious Disease:  - neg infectious disease ROS     Malignancy:  - neg malignancy ROS     Other:  - neg other ROS          Physical Exam    Airway  airway exam normal      Mallampati: I   TM distance: > 3 FB   Neck ROM: full   Mouth opening: > 3 cm    Respiratory Devices and Support         Dental  no notable dental history     (+) Minor Abnormalities - some fillings, tiny  chips      Cardiovascular   cardiovascular exam normal       Rhythm and rate: regular and normal     Pulmonary   pulmonary exam normal        breath sounds clear to auscultation           OUTSIDE LABS:  CBC:   Lab Results   Component Value Date    WBC 5.0 02/14/2022    WBC 5.4 04/20/2021    HGB 12.4 (L) 08/30/2023    HGB 13.2 (L) 02/14/2022    HCT 40.2 02/14/2022    HCT 42.6 04/20/2021     02/14/2022     04/20/2021     BMP:   Lab Results   Component Value Date     (L) 02/14/2023     (L) 10/13/2022    POTASSIUM 4.5 08/30/2023    POTASSIUM 4.5 02/14/2023    CHLORIDE 96 (L) 02/14/2023    CHLORIDE 98 10/13/2022    CO2 24 02/14/2023    CO2 25 10/13/2022    BUN 14.1 02/14/2023    BUN 14.4 10/13/2022    CR 0.78 02/14/2023    CR 0.81 10/13/2022     (H) 02/14/2023    GLC 94 10/13/2022     COAGS: No results found for: PTT, INR, FIBR  POC: No results found for: BGM, HCG, HCGS  HEPATIC:   Lab Results   Component Value Date    ALBUMIN 4.2 02/14/2023    PROTTOTAL 6.7 02/14/2023    ALT 20 02/14/2023    AST 25 02/14/2023    ALKPHOS 60 02/14/2023    BILITOTAL 0.7 02/14/2023     OTHER:   Lab Results   Component Value Date    REESE 9.5 02/14/2023       Anesthesia Plan    ASA Status:  2    NPO Status:  NPO Appropriate    Anesthesia Type: MAC.     - Reason for MAC: straight local not clinically adequate              Consents    Anesthesia Plan(s) and associated risks, benefits, and realistic alternatives discussed. Questions answered and patient/representative(s) expressed understanding.     - Discussed:     - Discussed with:  Patient            Postoperative Care    Pain management: IV analgesics, Oral pain medications, Multi-modal analgesia.   PONV prophylaxis: Ondansetron (or other 5HT-3), Dexamethasone or Solumedrol, Droperidol or Haldol     Comments:                JAQUELINE HARPER MD

## 2023-09-05 NOTE — OP NOTE
Name:  Rip Crockett  PCP:  Lauren Rangel  Procedure Date:  9/5/2023      Procedure(s):  HERNIORRHAPHY, INGUINAL, OPEN    Pre-Procedure Diagnosis:  Unilateral inguinal hernia without obstruction or gangrene, recurrence not specified [K40.90]     Post-Procedure Diagnosis:    Right indirect inguinal hernia    Surgeon(s):  Greg Delarosa DO    Circulator: Nano Beltrán RN; Rosana Keller RN  Scrub Person: Jazmyn Flores; Rosalba Sharma    Anesthesia Type:  local MAC      Findings:  Right indirect inguinal hernia    Operative Report:    The patient was taken the operating room and placed in a supine position.  The abdomen and inguinal region was prepped and draped in a sterile fashion.  Antibiotics were given prior skin incision.  I began the first portion of procedure by injecting local anesthetic in the right inguinal region.  I then made an incision along Sravani's lines and  subcutaneous tissues with electrocautery and blunt dissection.  I eventually encountered the fibers the external oblique and  these with sharp dissection.  Once complete I isolated the spermatic cord with blunt dissection and a Penrose drain.  I then dissected off the hernia sac from the indirect position.  Once complete I then placed a smallPerFix plug into the hernia defect.  This was sutured to the surrounding structures with 0 Prolene suture.  Once this was complete I then placed a progrip hernia mesh into the inguinal space and sutured this to the pubic symphysis with a 0 Prolene suture and wrapped the tails of the mesh around the spermatic cord.  The mesh was then placed under the fibers the external oblique and sutured in place to the surrounding structures and musculature with additional 0 Prolene sutures.  I then reapproximated the fibers of the external oblique musculature with a running 0 Vicryl suture.  I injected local anesthetic into the tissues once again and closed the wound with a 0  Vicryl suture and 4-0 Monocryl suture.  The wound was cleaned and covered with Steri-Strips and dry sterile dressing.   The  patient was brought out of sedation and sent to the PACU to undergo recovery.  All lap counts and needle counts were correct at the end the procedure.        Estimated Blood Loss:   5cc    Specimens:    * No specimens in log *       Drains:        Complications:    None    Greg Delarosa DO

## 2023-09-20 ENCOUNTER — OFFICE VISIT (OUTPATIENT)
Dept: SURGERY | Facility: CLINIC | Age: 73
End: 2023-09-20
Payer: COMMERCIAL

## 2023-09-20 DIAGNOSIS — Z48.89 POSTOPERATIVE VISIT: Primary | ICD-10-CM

## 2023-09-20 PROCEDURE — 99024 POSTOP FOLLOW-UP VISIT: CPT | Performed by: PHYSICIAN ASSISTANT

## 2023-09-20 NOTE — PROGRESS NOTES
HPI: Patient is here for follow-up of a open right inguinal hernia repair with mesh with Dr. Delarosa on 9/5/23.  He is doing well. Pain is well controlled. No difficulties with the surgical wound. He is eating well and denies fever and chills.        There were no vitals taken for this visit.    EXAM:  General: Appears well  Abdomen: Soft, non-tender, non-distended. No rebound or guarding.  Surgical wounds: Incision healing well, clean/dry/intact with no induration or drainage. Steri strips removed without issue.      Assessment/Plan: Doing well after surgery and should follow up as needed.    Rosalba Bean PA-C  [facility] General Surgery  [address] [address]

## 2023-09-20 NOTE — LETTER
9/20/2023         RE: Rip Crockett  [address] [place] [address]        Dear Colleague,    Thank you for referring your patient, Rip Crockett, to the [facility] [place]. Please see a copy of my visit note below.    HPI: Patient is here for follow-up of a open right inguinal hernia repair with mesh with Dr. Delarosa on 9/5/23.  He is doing well. Pain is well controlled. No difficulties with the surgical wound. He is eating well and denies fever and chills.        There were no vitals taken for this visit.    EXAM:  General: Appears well  Abdomen: Soft, non-tender, non-distended. No rebound or guarding.  Surgical wounds: Incision healing well, clean/dry/intact with no induration or drainage. Steri strips removed without issue.      Assessment/Plan: Doing well after surgery and should follow up as needed.    Rosalba Paiz PA-C  [facility] General Surgery  [address]  [address] [place] [id number]       Again, thank you for allowing me to participate in the care of your patient.        Sincerely,        ROSALBA PAIZ PA-C

## 2023-09-28 ENCOUNTER — APPOINTMENT (OUTPATIENT)
Dept: MRI IMAGING | Facility: CLINIC | Age: 73
End: 2023-09-28
Attending: EMERGENCY MEDICINE
Payer: COMMERCIAL

## 2023-09-28 ENCOUNTER — HOSPITAL ENCOUNTER (EMERGENCY)
Facility: CLINIC | Age: 73
Discharge: HOME | End: 2023-09-28
Attending: EMERGENCY MEDICINE | Admitting: EMERGENCY MEDICINE
Payer: COMMERCIAL

## 2023-09-28 VITALS
RESPIRATION RATE: 16 BRPM | TEMPERATURE: 98 F | WEIGHT: 142 LBS | DIASTOLIC BLOOD PRESSURE: 74 MMHG | BODY MASS INDEX: 23.63 KG/M2 | OXYGEN SATURATION: 100 % | HEART RATE: 66 BPM | SYSTOLIC BLOOD PRESSURE: 136 MMHG

## 2023-09-28 DIAGNOSIS — R42 VERTIGO: ICD-10-CM

## 2023-09-28 DIAGNOSIS — E87.1 HYPONATREMIA: ICD-10-CM

## 2023-09-28 LAB
ANION GAP SERPL CALCULATED.3IONS-SCNC: 10 MMOL/L (ref 7–15)
ATRIAL RATE - MUSE: 60 BPM
BASOPHILS # BLD AUTO: 0 10E3/UL (ref 0–0.2)
BASOPHILS NFR BLD AUTO: 1 %
BUN SERPL-MCNC: 14.6 MG/DL (ref 8–23)
CALCIUM SERPL-MCNC: 8.8 MG/DL (ref 8.8–10.2)
CHLORIDE SERPL-SCNC: 92 MMOL/L (ref 98–107)
CREAT SERPL-MCNC: 0.78 MG/DL (ref 0.67–1.17)
DIASTOLIC BLOOD PRESSURE - MUSE: NORMAL MMHG
EGFRCR SERPLBLD CKD-EPI 2021: >90 ML/MIN/1.73M2
EOSINOPHIL # BLD AUTO: 0.1 10E3/UL (ref 0–0.7)
EOSINOPHIL NFR BLD AUTO: 2 %
ERYTHROCYTE [DISTWIDTH] IN BLOOD BY AUTOMATED COUNT: 12.3 % (ref 10–15)
GLUCOSE SERPL-MCNC: 113 MG/DL (ref 70–99)
HCO3 SERPL-SCNC: 26 MMOL/L (ref 22–29)
HCT VFR BLD AUTO: 36.8 % (ref 40–53)
HGB BLD-MCNC: 12.6 G/DL (ref 13.3–17.7)
IMM GRANULOCYTES # BLD: 0 10E3/UL
IMM GRANULOCYTES NFR BLD: 0 %
INTERPRETATION ECG - MUSE: NORMAL
LYMPHOCYTES # BLD AUTO: 1 10E3/UL (ref 0.8–5.3)
LYMPHOCYTES NFR BLD AUTO: 16 %
MAGNESIUM SERPL-MCNC: 2.1 MG/DL (ref 1.7–2.3)
MCH RBC QN AUTO: 32.2 PG (ref 26.5–33)
MCHC RBC AUTO-ENTMCNC: 34.2 G/DL (ref 31.5–36.5)
MCV RBC AUTO: 94 FL (ref 78–100)
MONOCYTES # BLD AUTO: 0.6 10E3/UL (ref 0–1.3)
MONOCYTES NFR BLD AUTO: 10 %
NEUTROPHILS # BLD AUTO: 4.4 10E3/UL (ref 1.6–8.3)
NEUTROPHILS NFR BLD AUTO: 71 %
NRBC # BLD AUTO: 0 10E3/UL
NRBC BLD AUTO-RTO: 0 /100
P AXIS - MUSE: 46 DEGREES
PLATELET # BLD AUTO: 246 10E3/UL (ref 150–450)
POTASSIUM SERPL-SCNC: 4.8 MMOL/L (ref 3.4–5.3)
PR INTERVAL - MUSE: 112 MS
QRS DURATION - MUSE: 126 MS
QT - MUSE: 438 MS
QTC - MUSE: 438 MS
R AXIS - MUSE: 2 DEGREES
RBC # BLD AUTO: 3.91 10E6/UL (ref 4.4–5.9)
SODIUM SERPL-SCNC: 128 MMOL/L (ref 135–145)
SYSTOLIC BLOOD PRESSURE - MUSE: NORMAL MMHG
T AXIS - MUSE: 55 DEGREES
VENTRICULAR RATE- MUSE: 60 BPM
WBC # BLD AUTO: 6.1 10E3/UL (ref 4–11)

## 2023-09-28 PROCEDURE — 250N000013 HC RX MED GY IP 250 OP 250 PS 637: Performed by: EMERGENCY MEDICINE

## 2023-09-28 PROCEDURE — 70544 MR ANGIOGRAPHY HEAD W/O DYE: CPT

## 2023-09-28 PROCEDURE — 96361 HYDRATE IV INFUSION ADD-ON: CPT

## 2023-09-28 PROCEDURE — 250N000011 HC RX IP 250 OP 636: Performed by: EMERGENCY MEDICINE

## 2023-09-28 PROCEDURE — 70553 MRI BRAIN STEM W/O & W/DYE: CPT

## 2023-09-28 PROCEDURE — 36415 COLL VENOUS BLD VENIPUNCTURE: CPT | Performed by: EMERGENCY MEDICINE

## 2023-09-28 PROCEDURE — 83735 ASSAY OF MAGNESIUM: CPT | Performed by: EMERGENCY MEDICINE

## 2023-09-28 PROCEDURE — 255N000002 HC RX 255 OP 636: Mod: JZ | Performed by: EMERGENCY MEDICINE

## 2023-09-28 PROCEDURE — A9585 GADOBUTROL INJECTION: HCPCS | Mod: JZ | Performed by: EMERGENCY MEDICINE

## 2023-09-28 PROCEDURE — 258N000003 HC RX IP 258 OP 636: Performed by: EMERGENCY MEDICINE

## 2023-09-28 PROCEDURE — 96374 THER/PROPH/DIAG INJ IV PUSH: CPT | Mod: 59

## 2023-09-28 PROCEDURE — 80048 BASIC METABOLIC PNL TOTAL CA: CPT | Performed by: EMERGENCY MEDICINE

## 2023-09-28 PROCEDURE — 99285 EMERGENCY DEPT VISIT HI MDM: CPT | Mod: 25

## 2023-09-28 PROCEDURE — 93005 ELECTROCARDIOGRAM TRACING: CPT | Performed by: EMERGENCY MEDICINE

## 2023-09-28 PROCEDURE — 85025 COMPLETE CBC W/AUTO DIFF WBC: CPT | Performed by: EMERGENCY MEDICINE

## 2023-09-28 PROCEDURE — 70549 MR ANGIOGRAPH NECK W/O&W/DYE: CPT

## 2023-09-28 RX ORDER — LORAZEPAM 2 MG/ML
1 INJECTION INTRAMUSCULAR ONCE
Status: COMPLETED | OUTPATIENT
Start: 2023-09-28 | End: 2023-09-28

## 2023-09-28 RX ORDER — MECLIZINE HYDROCHLORIDE 25 MG/1
25 TABLET ORAL ONCE
Status: COMPLETED | OUTPATIENT
Start: 2023-09-28 | End: 2023-09-28

## 2023-09-28 RX ORDER — GADOBUTROL 604.72 MG/ML
7 INJECTION INTRAVENOUS ONCE
Status: COMPLETED | OUTPATIENT
Start: 2023-09-28 | End: 2023-09-28

## 2023-09-28 RX ORDER — MECLIZINE HYDROCHLORIDE 25 MG/1
25 TABLET ORAL 3 TIMES DAILY PRN
Qty: 21 TABLET | Refills: 0 | Status: SHIPPED | OUTPATIENT
Start: 2023-09-28

## 2023-09-28 RX ADMIN — MECLIZINE HYDROCHLORIDE 25 MG: 25 TABLET ORAL at 08:48

## 2023-09-28 RX ADMIN — SODIUM CHLORIDE 1000 ML: 9 INJECTION, SOLUTION INTRAVENOUS at 12:08

## 2023-09-28 RX ADMIN — LORAZEPAM 1 MG: 2 INJECTION INTRAMUSCULAR; INTRAVENOUS at 12:18

## 2023-09-28 RX ADMIN — GADOBUTROL 10 ML: 604.72 INJECTION INTRAVENOUS at 11:47

## 2023-09-28 ASSESSMENT — ENCOUNTER SYMPTOMS
VOMITING: 0
DIZZINESS: 1
NAUSEA: 1
PALPITATIONS: 0

## 2023-09-28 ASSESSMENT — ACTIVITIES OF DAILY LIVING (ADL): ADLS_ACUITY_SCORE: 35

## 2023-09-28 NOTE — DISCHARGE INSTRUCTIONS
Take meclizine as prescribed for management of the vertiginous symptoms.  As we discussed your sodium is mildly depressed compared to your baseline.  You received fluids here in the emergency department.  I recommend close follow-up with your primary care physician on Monday for repeat check of your sodium level.  In the interim if you have escalation to your symptoms please return to the emergency department repeat assessment.

## 2023-09-28 NOTE — ED PROVIDER NOTES
EMERGENCY DEPARTMENT ENCOUNTER      NAME: Rip Crockett  AGE: 73 year old male  YOB: 1950  MRN: [id number]  EVALUATION DATE & TIME: No admission date for patient encounter.    PCP: Lauren Rangel    ED PROVIDER: Krishna Humphrey M.D.     Chief Complaint   Patient presents with    Dizziness     FINAL IMPRESSION:  1. Vertigo    2. Hyponatremia        ED COURSE & MEDICAL DECISION MAKING:    Pertinent Labs & Imaging studies reviewed. (See chart for details)  73 year old male presents to the Emergency Department for evaluation of dizziness.  Patient awakening this morning with dizziness symptoms.  It was a little difficult to characterize the exact nature and specifics of his symptoms as the patient was somewhat dismissive with regard to my questions trying to elicit more specifics with regard to what he was feeling.  It does sound that he was fine last night.  He has dizziness this morning but but could not characterize if it was positional or worse with movement or more of a lightheadedness as opposed to vertiginous type symptom.  This patient on examination was that this was more vertiginous in nature.  He did not have nystagmus there was no central ataxia and I felt that his symptoms were most likely consistent with a peripheral mediated process.  Certainly no signs of large vessel occlusion based on examination.  I did not feel based on the above that the patient was a stroke code candidate in part due to him having such a low NIH stroke scale and also waking up with his symptoms.  Last known well would have been when he went to bed last night.  I did recommend to the patient that we proceed with laboratory testing ECG as well as MRI imaging for further assessment we will work to manage his symptoms.  Anticipate if the MRI is unremarkable and the patient improves with some treatment here in the emergency department that we could discharge him home with symptomatic care and follow-up.    12:01 PM  MRI  results pending.  Electrolytes notable for mild hyponatremia.  This appears to be acute on chronic although somewhat worse today than usual.  He is receiving some fluids here in the emergency department.  Could be a contributing factor to his current symptomatology.  We will await MRI results.  The sodium is not a level that would require admission to the hospital would need some close follow-up in outpatient basis.    2:07 PM  Patient feels improved.  Overall I think we can proceed with discharge.  I reviewed with them the MRI test results as well as his sodium levels and our plan of care is for discharge with close follow-up and repeat sodium on an outpatient basis.       8:29 AM I met with the patient, obtained history, performed an initial exam, and discussed options and plan for diagnostics and treatment here in the ED.    1315 9/30  I instructed patient at discharge to follow-up with his pcp to get repeat sodium check scheduled, I received notification from clinic requesting lab order.  So I am placing that as a discharge order post discharge.    At the conclusion of the encounter I discussed the results of all of the tests and the disposition. The questions were answered. The patient or family acknowledged understanding and was agreeable with the care plan.       Medical Decision Making    History:  Supplemental history from: Family Member/Significant Other  External Record(s) reviewed: Inpatient Record: [facility] ED    Work Up:  Chart documentation includes differential considered and any EKGs or imaging independently interpreted by provider, where specified.  In additional to work up documented, I considered the following work up: Documented in chart, if applicable.    External consultation:  Discussion of management with another provider: Documented in chart, if applicable    Complicating factors:  Care impacted by chronic illness: Hypertension and Smoking / Nicotine Use  Care affected by social  "determinants of health: N/A    Disposition considerations: Discharge. No recommendations on prescription strength medication(s). I considered admission, but discharged patient after significant clinical improvement.        MEDICATIONS GIVEN IN THE EMERGENCY:  Medications   meclizine (ANTIVERT) tablet 25 mg (25 mg Oral $Given 9/28/23 0865)   sodium chloride 0.9% BOLUS 1,000 mL (0 mLs Intravenous Stopped 9/28/23 1341)   gadobutrol (GADAVIST) injection 7 mL (10 mLs Intravenous $Given 9/28/23 1147)   LORazepam (ATIVAN) injection 1 mg (1 mg Intravenous $Given 9/28/23 1218)       NEW PRESCRIPTIONS STARTED AT TODAY'S ER VISIT  Discharge Medication List as of 9/28/2023  1:41 PM        START taking these medications    Details   meclizine (ANTIVERT) 25 MG tablet Take 1 tablet (25 mg) by mouth 3 times daily as needed for dizziness, Disp-21 tablet, R-0, Local Print                =================================================================    HPI    Patient information was obtained from: patient     Use of : N/A       Rip Crockett is a 73 year old male with a pertinent history of hypertension, pulmonary emphysema, AAA, and former tobacco use who presents to this ED via wheelchair for evaluation of dizziness.    The patient presents with \"dizziness\" since he woke up this morning. He described it as being similar to carsickness and was nauseated earlier today. He is not sure if it is more like room-spinning, vertigo dizziness or lightheadedness. He states that he is able to walk but does not trust himself to walk. He is not sure if the dizziness is positional or if anything makes it better or worse. He has hypertension and takes Losartan 50 mg and Atorvastatin 40 mg. He states he felt normal last night and has never had this before. Of note, he had an inguinal surgery repair on 9/5 that he says he is healing well from.     He denies prior stroke history, ear pain, ear drainage, chest pain, palpitation, vomiting, " or any other complaints at this time.       Chart Review:  9/5/2023: The patient had a right inguinal hernia that was repaired 3 weeks ago. There were no complications with the procedure. He was seen at a follow up on 9/20 and was healing well.       REVIEW OF SYSTEMS   Review of Systems   HENT:  Negative for ear discharge and ear pain.    Cardiovascular:  Negative for chest pain and palpitations.   Gastrointestinal:  Positive for nausea. Negative for vomiting.   Neurological:  Positive for dizziness.   All other systems reviewed and are negative.       PAST MEDICAL HISTORY:  Past Medical History:   Diagnosis Date    Benign essential hypertension 08/16/2021    Sprain and strain of unspecified site of hip and thigh     Created by Conversion        PAST SURGICAL HISTORY:  Past Surgical History:   Procedure Laterality Date    HERNIORRHAPHY INGUINAL Right 9/5/2023    Procedure: HERNIORRHAPHY, INGUINAL, OPEN RIGHT;  Surgeon: Greg Delarosa DO;  Location: [facility] Main OR    ORTHOPEDIC SURGERY             CURRENT MEDICATIONS:    meclizine (ANTIVERT) 25 MG tablet  atorvastatin (LIPITOR) 40 MG tablet  docusate sodium (COLACE) 100 MG capsule  HYDROcodone-acetaminophen (NORCO) 5-325 MG tablet  ibuprofen (ADVIL,MOTRIN) 200 MG tablet  losartan (COZAAR) 50 MG tablet  sildenafil (REVATIO) 20 MG tablet        ALLERGIES:  Allergies   Allergen Reactions    Penicillins     Cephalexin Hives and Rash    Cephalosporins Rash       FAMILY HISTORY:  Family History   Problem Relation Age of Onset    No Known Problems Mother     No Known Problems Father     No Known Problems Maternal Grandmother     No Known Problems Maternal Grandfather     No Known Problems Paternal Grandmother     No Known Problems Paternal Grandfather     No Known Problems Sister     No Known Problems Brother     No Known Problems Maternal Half-Brother     No Known Problems Maternal Half-Sister     No Known Problems Paternal Half-Brother     No Known Problems  Paternal Half-Sister     No Known Problems Daughter     No Known Problems Son     No Known Problems Maternal Aunt     No Known Problems Maternal Uncle     No Known Problems Paternal Aunt     No Known Problems Paternal Uncle     No Known Problems Cousin     No Known Problems Other        SOCIAL HISTORY:   Social History     Socioeconomic History    Marital status:    Tobacco Use    Smoking status: Former     Packs/day: 1.00     Types: Cigarettes    Smokeless tobacco: Former     Quit date: 6/10/2016    Tobacco comments:     smoking since age 20 years old per pt  NO VAPING   Substance and Sexual Activity    Alcohol use: Yes     Comment: Alcoholic Drinks/day: 3-5 beers per day    Drug use: No   Other Topics Concern    Parent/sibling w/ CABG, MI or angioplasty before 65F 55M? No       VITALS:  /74   Pulse 66   Temp 98  F (36.7  C) (Oral)   Resp 16   Wt 64.4 kg (142 lb)   SpO2 100%   BMI 23.63 kg/m      PHYSICAL EXAM    PHYSICAL EXAM    Constitutional: Well developed, Well nourished, NAD  HENT: Normocephalic, Atraumatic, Bilateral external ears normal, Oropharynx normal, mucous membranes moist, Nose normal. Neck-  Normal range of motion, No tenderness, Supple, No stridor.   Eyes: PERRL, EOMI, Conjunctiva normal, No discharge.   Respiratory: Normal breath sounds, No respiratory distress, No wheezing, Speaks full sentences easily. No cough.   Cardiovascular: Normal heart rate, Regular rhythm, No murmurs Chest wall nontender.    GI:  Soft, No tenderness, No masses, No flank tenderness. No rebound or guarding.  : No cva tenderness    Musculoskeletal: 2+ DP pulses. No edema. No cyanosis. Good range of motion in all major joints. No tenderness to palpation. No tenderness of the CTLS spine.   Integument: Warm, Dry, No erythema, No rash. No petechiae.   Neurologic: Alert & oriented x 3, Normal motor function, Normal sensory function, No focal deficits noted.   Psychiatric: Affect normal, Judgment normal, Mood  normal. Cooperative.      LAB:  All pertinent labs reviewed and interpreted.  Results for orders placed or performed during the hospital encounter of 09/28/23   MR Brain w/o & w Contrast    Impression    IMPRESSION:  HEAD MRI:  1.  No acute intracranial process.  2.  Generalized brain atrophy and presumed chronic microvascular ischemic changes.    HEAD MRA:  1.  No aneurysm, high flow AVM or significant stenosis identified.  2.  Variant [place] anatomy as above.    NECK MRA:  1.  No hemodynamically significant stenosis in the carotid vessels by NASCET criteria.  2.  Suboptimal visualization of the vertebral artery origins and proximal V1 segments presumably due to motion. Underlying stenosis in these areas is difficult to exclude.  3.  No evidence for dissection.     MRA Brain ([place]) wo Contrast    Impression    IMPRESSION:  HEAD MRI:  1.  No acute intracranial process.  2.  Generalized brain atrophy and presumed chronic microvascular ischemic changes.    HEAD MRA:  1.  No aneurysm, high flow AVM or significant stenosis identified.  2.  Variant [place] anatomy as above.    NECK MRA:  1.  No hemodynamically significant stenosis in the carotid vessels by NASCET criteria.  2.  Suboptimal visualization of the vertebral artery origins and proximal V1 segments presumably due to motion. Underlying stenosis in these areas is difficult to exclude.  3.  No evidence for dissection.     MRA Neck (Carotids) wo & w Contrast    Impression    IMPRESSION:  HEAD MRI:  1.  No acute intracranial process.  2.  Generalized brain atrophy and presumed chronic microvascular ischemic changes.    HEAD MRA:  1.  No aneurysm, high flow AVM or significant stenosis identified.  2.  Variant [place] anatomy as above.    NECK MRA:  1.  No hemodynamically significant stenosis in the carotid vessels by NASCET criteria.  2.  Suboptimal visualization of the vertebral artery origins and proximal V1 segments  presumably due to motion. Underlying stenosis in these areas is difficult to exclude.  3.  No evidence for dissection.     Basic metabolic panel   Result Value Ref Range    Sodium 128 (L) 135 - 145 mmol/L    Potassium 4.8 3.4 - 5.3 mmol/L    Chloride 92 (L) 98 - 107 mmol/L    Carbon Dioxide (CO2) 26 22 - 29 mmol/L    Anion Gap 10 7 - 15 mmol/L    Urea Nitrogen 14.6 8.0 - 23.0 mg/dL    Creatinine 0.78 0.67 - 1.17 mg/dL    GFR Estimate >90 >60 mL/min/1.73m2    Calcium 8.8 8.8 - 10.2 mg/dL    Glucose 113 (H) 70 - 99 mg/dL   Result Value Ref Range    Magnesium 2.1 1.7 - 2.3 mg/dL   CBC with platelets and differential   Result Value Ref Range    WBC Count 6.1 4.0 - 11.0 10e3/uL    RBC Count 3.91 (L) 4.40 - 5.90 10e6/uL    Hemoglobin 12.6 (L) 13.3 - 17.7 g/dL    Hematocrit 36.8 (L) 40.0 - 53.0 %    MCV 94 78 - 100 fL    MCH 32.2 26.5 - 33.0 pg    MCHC 34.2 31.5 - 36.5 g/dL    RDW 12.3 10.0 - 15.0 %    Platelet Count 246 150 - 450 10e3/uL    % Neutrophils 71 %    % Lymphocytes 16 %    % Monocytes 10 %    % Eosinophils 2 %    % Basophils 1 %    % Immature Granulocytes 0 %    NRBCs per 100 WBC 0 <1 /100    Absolute Neutrophils 4.4 1.6 - 8.3 10e3/uL    Absolute Lymphocytes 1.0 0.8 - 5.3 10e3/uL    Absolute Monocytes 0.6 0.0 - 1.3 10e3/uL    Absolute Eosinophils 0.1 0.0 - 0.7 10e3/uL    Absolute Basophils 0.0 0.0 - 0.2 10e3/uL    Absolute Immature Granulocytes 0.0 <=0.4 10e3/uL    Absolute NRBCs 0.0 10e3/uL   ECG 12-LEAD WITH MUSE (LHE)   Result Value Ref Range    Systolic Blood Pressure  mmHg    Diastolic Blood Pressure  mmHg    Ventricular Rate 60 BPM    Atrial Rate 60 BPM    IL Interval 112 ms    QRS Duration 126 ms     ms    QTc 438 ms    P Axis 46 degrees    R AXIS 2 degrees    T Axis 55 degrees    Interpretation ECG       Sinus rhythm  Right bundle branch block  Abnormal ECG  When compared with ECG of 30-AUG-2023 08:50,  No significant change was found  Confirmed by SEE ED PROVIDER NOTE FOR, ECG INTERPRETATION  ([id number]),  CODY AVILA ([id number]) on 9/28/2023 11:05:37 AM         RADIOLOGY:  Reviewed all pertinent imaging. Please see official radiology report.  MR Brain w/o & w Contrast   Final Result   IMPRESSION:   HEAD MRI:   1.  No acute intracranial process.   2.  Generalized brain atrophy and presumed chronic microvascular ischemic changes.      HEAD MRA:   1.  No aneurysm, high flow AVM or significant stenosis identified.   2.  Variant [place] anatomy as above.      NECK MRA:   1.  No hemodynamically significant stenosis in the carotid vessels by NASCET criteria.   2.  Suboptimal visualization of the vertebral artery origins and proximal V1 segments presumably due to motion. Underlying stenosis in these areas is difficult to exclude.   3.  No evidence for dissection.         MRA Brain ([place]) wo Contrast   Final Result   IMPRESSION:   HEAD MRI:   1.  No acute intracranial process.   2.  Generalized brain atrophy and presumed chronic microvascular ischemic changes.      HEAD MRA:   1.  No aneurysm, high flow AVM or significant stenosis identified.   2.  Variant [place] anatomy as above.      NECK MRA:   1.  No hemodynamically significant stenosis in the carotid vessels by NASCET criteria.   2.  Suboptimal visualization of the vertebral artery origins and proximal V1 segments presumably due to motion. Underlying stenosis in these areas is difficult to exclude.   3.  No evidence for dissection.         MRA Neck (Carotids) wo & w Contrast   Final Result   IMPRESSION:   HEAD MRI:   1.  No acute intracranial process.   2.  Generalized brain atrophy and presumed chronic microvascular ischemic changes.      HEAD MRA:   1.  No aneurysm, high flow AVM or significant stenosis identified.   2.  Variant [place] anatomy as above.      NECK MRA:   1.  No hemodynamically significant stenosis in the carotid vessels by NASCET criteria.   2.  Suboptimal visualization of the vertebral artery  origins and proximal V1 segments presumably due to motion. Underlying stenosis in these areas is difficult to exclude.   3.  No evidence for dissection.             EKG:    Performed at: September 28, 2023, 8:49 AM, [facility] EMERGENCY ROOM    Impression: Sinus rhythm. RBBB. Abnormal ECG. When compared with ECG of 30-AUG-2023, no significant change was found.    Rate: 60 BPM  Rhythm: Sinus rhythm  Axis: 46 2 55  MO Interval: 112 ms  QRS Interval: 126 ms  QTc Interval: 438/438 ms  ST Changes: none  Comparison: When compared with ECG of 30-AUG-2023, no significant change was found.    I have independently reviewed and interpreted the EKG(s) documented above.    I, Jacinto Doyle, am serving as a scribe to document services personally performed by Krishna Humhprey M.D. based on my observation and the provider's statements to me. I, Krishna Humphrey M.D., attest that Jacinto Doyle is acting in a scribe capacity, has observed my performance of the services and has documented them in accordance with my direction.    Krishna Humphrey M.D.  [facility] EMERGENCY ROOM  [address], [place] [id number] [phone]       Krishna Humphrey MD  09/28/23 1405       Krishna Hupmhrey MD  09/30/23 [id number]

## 2023-10-02 ENCOUNTER — LAB (OUTPATIENT)
Dept: LAB | Facility: CLINIC | Age: 73
End: 2023-10-02
Payer: COMMERCIAL

## 2023-10-02 DIAGNOSIS — E87.1 HYPONATREMIA: ICD-10-CM

## 2023-10-02 LAB
ANION GAP SERPL CALCULATED.3IONS-SCNC: 10 MMOL/L (ref 7–15)
BUN SERPL-MCNC: 11.2 MG/DL (ref 8–23)
CALCIUM SERPL-MCNC: 9.4 MG/DL (ref 8.8–10.2)
CHLORIDE SERPL-SCNC: 96 MMOL/L (ref 98–107)
CREAT SERPL-MCNC: 0.83 MG/DL (ref 0.67–1.17)
DEPRECATED HCO3 PLAS-SCNC: 24 MMOL/L (ref 22–29)
EGFRCR SERPLBLD CKD-EPI 2021: >90 ML/MIN/1.73M2
GLUCOSE SERPL-MCNC: 88 MG/DL (ref 70–99)
POTASSIUM SERPL-SCNC: 4.6 MMOL/L (ref 3.4–5.3)
SODIUM SERPL-SCNC: 130 MMOL/L (ref 135–145)

## 2023-10-02 PROCEDURE — 36415 COLL VENOUS BLD VENIPUNCTURE: CPT

## 2023-10-02 PROCEDURE — 80048 BASIC METABOLIC PNL TOTAL CA: CPT

## 2023-12-11 ENCOUNTER — PATIENT OUTREACH (OUTPATIENT)
Dept: GASTROENTEROLOGY | Facility: CLINIC | Age: 73
End: 2023-12-11
Payer: COMMERCIAL

## 2024-02-04 DIAGNOSIS — R03.0 ELEVATED BP WITHOUT DIAGNOSIS OF HYPERTENSION: ICD-10-CM

## 2024-02-05 RX ORDER — LOSARTAN POTASSIUM 50 MG/1
50 TABLET ORAL DAILY
Qty: 90 TABLET | Refills: 0 | OUTPATIENT
Start: 2024-02-05

## 2024-02-20 ENCOUNTER — OFFICE VISIT (OUTPATIENT)
Dept: FAMILY MEDICINE | Facility: CLINIC | Age: 74
End: 2024-02-20
Payer: COMMERCIAL

## 2024-02-20 VITALS
OXYGEN SATURATION: 99 % | HEART RATE: 69 BPM | TEMPERATURE: 98.1 F | HEIGHT: 66 IN | DIASTOLIC BLOOD PRESSURE: 79 MMHG | RESPIRATION RATE: 16 BRPM | SYSTOLIC BLOOD PRESSURE: 135 MMHG | BODY MASS INDEX: 22.5 KG/M2 | WEIGHT: 140 LBS

## 2024-02-20 DIAGNOSIS — I70.0 ATHEROSCLEROSIS OF ABDOMINAL AORTA (H): ICD-10-CM

## 2024-02-20 DIAGNOSIS — J43.9 PULMONARY EMPHYSEMA, UNSPECIFIED EMPHYSEMA TYPE (H): ICD-10-CM

## 2024-02-20 DIAGNOSIS — R03.0 ELEVATED BP WITHOUT DIAGNOSIS OF HYPERTENSION: ICD-10-CM

## 2024-02-20 DIAGNOSIS — Z87.891 PERSONAL HISTORY OF TOBACCO USE: ICD-10-CM

## 2024-02-20 DIAGNOSIS — R91.8 PULMONARY NODULES: ICD-10-CM

## 2024-02-20 DIAGNOSIS — I71.40 ABDOMINAL AORTIC ANEURYSM (AAA) 30 TO 34 MM IN DIAMETER (H): ICD-10-CM

## 2024-02-20 DIAGNOSIS — Z00.00 ENCOUNTER FOR MEDICARE ANNUAL WELLNESS EXAM: Primary | ICD-10-CM

## 2024-02-20 DIAGNOSIS — Z87.891 PERSONAL HISTORY OF NICOTINE DEPENDENCE: ICD-10-CM

## 2024-02-20 LAB
ALBUMIN SERPL BCG-MCNC: 4.5 G/DL (ref 3.5–5.2)
ALP SERPL-CCNC: 57 U/L (ref 40–150)
ALT SERPL W P-5'-P-CCNC: 25 U/L (ref 0–70)
ANION GAP SERPL CALCULATED.3IONS-SCNC: 11 MMOL/L (ref 7–15)
AST SERPL W P-5'-P-CCNC: 27 U/L (ref 0–45)
BILIRUB SERPL-MCNC: 0.5 MG/DL
BUN SERPL-MCNC: 16.7 MG/DL (ref 8–23)
CALCIUM SERPL-MCNC: 9.4 MG/DL (ref 8.8–10.2)
CHLORIDE SERPL-SCNC: 96 MMOL/L (ref 98–107)
CHOLEST SERPL-MCNC: 172 MG/DL
CREAT SERPL-MCNC: 0.79 MG/DL (ref 0.67–1.17)
DEPRECATED HCO3 PLAS-SCNC: 24 MMOL/L (ref 22–29)
EGFRCR SERPLBLD CKD-EPI 2021: >90 ML/MIN/1.73M2
FASTING STATUS PATIENT QL REPORTED: YES
GLUCOSE SERPL-MCNC: 91 MG/DL (ref 70–99)
HDLC SERPL-MCNC: 73 MG/DL
LDLC SERPL CALC-MCNC: 88 MG/DL
NONHDLC SERPL-MCNC: 99 MG/DL
POTASSIUM SERPL-SCNC: 4.3 MMOL/L (ref 3.4–5.3)
PROT SERPL-MCNC: 6.9 G/DL (ref 6.4–8.3)
SODIUM SERPL-SCNC: 131 MMOL/L (ref 135–145)
TRIGL SERPL-MCNC: 57 MG/DL

## 2024-02-20 PROCEDURE — G0296 VISIT TO DETERM LDCT ELIG: HCPCS | Performed by: FAMILY MEDICINE

## 2024-02-20 PROCEDURE — 99214 OFFICE O/P EST MOD 30 MIN: CPT | Mod: 25 | Performed by: FAMILY MEDICINE

## 2024-02-20 PROCEDURE — 80061 LIPID PANEL: CPT | Performed by: FAMILY MEDICINE

## 2024-02-20 PROCEDURE — 80053 COMPREHEN METABOLIC PANEL: CPT | Performed by: FAMILY MEDICINE

## 2024-02-20 PROCEDURE — 36415 COLL VENOUS BLD VENIPUNCTURE: CPT | Performed by: FAMILY MEDICINE

## 2024-02-20 PROCEDURE — G0439 PPPS, SUBSEQ VISIT: HCPCS | Performed by: FAMILY MEDICINE

## 2024-02-20 RX ORDER — LOSARTAN POTASSIUM 50 MG/1
50 TABLET ORAL DAILY
Qty: 90 TABLET | Refills: 2 | Status: SHIPPED | OUTPATIENT
Start: 2024-02-20

## 2024-02-20 RX ORDER — ATORVASTATIN CALCIUM 40 MG/1
40 TABLET, FILM COATED ORAL DAILY
Start: 2024-02-20 | End: 2024-08-06

## 2024-02-20 SDOH — HEALTH STABILITY: PHYSICAL HEALTH: ON AVERAGE, HOW MANY DAYS PER WEEK DO YOU ENGAGE IN MODERATE TO STRENUOUS EXERCISE (LIKE A BRISK WALK)?: 7 DAYS

## 2024-02-20 ASSESSMENT — SOCIAL DETERMINANTS OF HEALTH (SDOH): HOW OFTEN DO YOU GET TOGETHER WITH FRIENDS OR RELATIVES?: MORE THAN THREE TIMES A WEEK

## 2024-02-20 NOTE — PROGRESS NOTES
Lung Cancer Screening Shared Decision Making Visit     Rip Crockett, a 73 year old male, is eligible for lung cancer screening    History   Smoking Status     Former     Packs/day: 1.00     Types: Cigarettes   Smokeless Tobacco     Former     Quit date: 6/10/2016       I have discussed with patient the risks and benefits of screening for lung cancer with low-dose CT.     The risks include:    radiation exposure: one low dose chest CT has as much ionizing radiation as about 15 chest x-rays, or 6 months of background radiation living in [place]      false positives: most findings/nodules are NOT cancer, but some might still require additional diagnostic evaluation, including biopsy    over-diagnosis: some slow growing cancers that might never have been clinically significant will be detected and treated unnecessarily     The benefit of early detection of lung cancer is contingent upon adherence to annual screening or more frequent follow up if indicated.     Furthermore, to benefit from screening, Rip must be willing and able to undergo diagnostic procedures, if indicated. Although no specific guide is available for determining severity of comorbidities, it is reasonable to withhold screening in patients who have greater mortality risk from other diseases.     We did discuss that the best way to prevent lung cancer is to not smoke.    Some patients may value a numeric estimation of lung cancer risk when evaluating if lung cancer screening is right for them, here is one calculator:    ShouldIScreen

## 2024-02-20 NOTE — PROGRESS NOTES
Preventive Care Visit  [facility]  Lauren Rangel MD, Family Medicine  Feb 20, 2024    Assessment & Plan     Encounter for medication refill    - atorvastatin (LIPITOR) 40 MG tablet; Take 1 tablet (40 mg) by mouth daily    Pulmonary emphysema, unspecified emphysema type (H)    - Spirometry, Breathing Capacity, Normal Order, Clinic Performed; Future    Elevated BP without diagnosis of hypertension    - losartan (COZAAR) 50 MG tablet; Take 1 tablet (50 mg) by mouth daily    Atherosclerosis of abdominal aorta (H24)    - REVIEW OF HEALTH MAINTENANCE PROTOCOL ORDERS  - Lipid panel reflex to direct LDL Non-fasting; Future  - atorvastatin (LIPITOR) 40 MG tablet; Take 1 tablet (40 mg) by mouth daily  - Comprehensive metabolic panel (BMP + Alb, Alk Phos, ALT, AST, Total. Bili, TP); Future  - Lipid panel reflex to direct LDL Non-fasting  - Comprehensive metabolic panel (BMP + Alb, Alk Phos, ALT, AST, Total. Bili, TP)    Pulmonary nodules    - Prof fee: Shared Decision Making for Lung Cancer Screening  - CT Chest Lung Cancer Scrn Low Dose wo; Future    Personal history of nicotine dependence    - Prof fee: Shared Decision Making for Lung Cancer Screening  - CT Chest Lung Cancer Scrn Low Dose wo; Future    Personal history of tobacco use      Abdominal aortic aneurysm (AAA) 30 to 34 mm in diameter  Stable, due for follow-up ultrasound in 2025.    Patient has been advised of split billing requirements and indicates understanding: Yes  Review of external notes as documented elsewhere in note  40 minutes spent by me on the date of the encounter doing chart review, review of outside records, review of test results, interpretation of tests, patient visit, and documentation       Counseling  Appropriate preventive services were discussed with this patient, including applicable screening as appropriate for fall prevention, nutrition, physical activity, Tobacco-use cessation, weight loss and cognition.   Checklist reviewing preventive services available has been given to the patient.  Reviewed patient's diet, addressing concerns and/or questions.   He is at risk for psychosocial distress and has been provided with information to reduce risk.   The patient reports drinking more than one alcoholic drink per day and sometimes engages in binge or excessive drinking. The patient was counseled and given information about possible harmful effects of excessive alcohol intake as well as where to get help for alcohol problems.     Regular exercise    Assessment and Plan:    Symptomatic hyponatremia  Concerning for possible SIADH  -Recheck CMP  -Advised fluid restriction and continued electrolyte replenishment with Gatorade.     Emphysema, hx smoking  -Referral to spirometry  -Yearly low dose screening CT    Health maintenance  -Refilled Lipitor  -Lipids        Subjective   Rip is a 73 year old, presenting for the following:  Wellness Visit and Discuss test results from ER visit        2/20/2024     8:05 AM   Additional Questions   Roomed by yAanna SMITH   Accompanied by wife         Health Care Directive  Patient has a Health Care Directive on file  Discussed advance care planning with patient.    Rip is a 73 year old male with PMH notable for HTN, HLD, AAA, emphysema, who presents to clinic for annual wellness visit. He has been feeling well and has no acute complaints. He was seen in the ED in September for an episode of vertigo and dizziness that was diagnosed as symptomatic hyponatremia: since then has been drinking Gatorade and has not had a recurrence of symptoms. He also notes some mild impairment in his memory, but has not gotten lost while driving, forgotten where he is, or been unable to complete ADLs.           2/20/2024   General Health   How would you rate your overall physical health? (!) POOR   Feel stress (tense, anxious, or unable to sleep) Only a little   (!) STRESS CONCERN      2/20/2024   Nutrition   Diet:  Regular (no restrictions)         2/20/2024   Exercise   Days per week of moderate/strenous exercise 7 days         2/20/2024   Social Factors   Frequency of gathering with friends or relatives More than three times a week   Worry food won't last until get money to buy more No   Food not last or not have enough money for food? No   Do you have housing?  Yes   Are you worried about losing your housing? No   Lack of transportation? No   Unable to get utilities (heat,electricity)? No         2/20/2024   Fall Risk   Fallen 2 or more times in the past year? Yes    Yes   Trouble with walking or balance? No    No   Gait Speed Test (Document in seconds) 4   Gait Speed Test Interpretation Less than or equal to 5.00 seconds - PASS          2/20/2024   Activities of Daily Living- Home Safety   Needs help with the following daily activites None of the above   Safety concerns in the home None of the above         2/20/2024   Dental   Dentist two times every year? Yes         2/20/2024   Hearing Screening   Hearing concerns? None of the above         2/20/2024   Driving Risk Screening   Patient/family members have concerns about driving No         2/20/2024   General Alertness/Fatigue Screening   Have you been more tired than usual lately? No         2/20/2024   Urinary Incontinence Screening   Bothered by leaking urine in past 6 months No          No data to display                  Today's PHQ-2 Score:       2/20/2024     7:46 AM   PHQ-2 ( 1999 [employer])   Q1: Little interest or pleasure in doing things 0   Q2: Feeling down, depressed or hopeless 0   PHQ-2 Score 0   Q1: Little interest or pleasure in doing things Not at all   Q2: Feeling down, depressed or hopeless Not at all   PHQ-2 Score 0           2/20/2024   Substance Use   Alcohol more than 3/day or more than 7/wk Yes   How often do you have a drink containing alcohol 4 or more times a week   How many alcohol drinks on typical day 5 or 6   How often do you have 5+ drinks at  one occasion Daily or almost daily   Audit 2/3 Score 6   How often not able to stop drinking once started Never   How often failed to do what normally expected Never   How often needed first drink in am after a heavy drinking session Never   How often feeling of guilt or remorse after drinking Never   How often unable to remember what happened the night before Never   Have you or someone else been injured because of your drinking No   Has anyone been concerned or suggested you cut down on drinking No   TOTAL SCORE - AUDIT 10   Do you have a current opioid prescription? No   How severe/bad is pain from 1 to 10? 0/10 (No Pain)   Do you use any other substances recreationally? (!) CANNABIS PRODUCTS     Social History     Tobacco Use    Smoking status: Former     Packs/day: 1     Types: Cigarettes    Smokeless tobacco: Former     Quit date: 6/10/2016    Tobacco comments:     smoking since age 20 years old per pt  NO VAPING   Substance Use Topics    Alcohol use: Yes     Comment: Alcoholic Drinks/day: 3-5 beers per day    Drug use: No       The 10-year ASCVD risk score (Ralph WELLINGTON, et al., 2019) is: 18.6%    Values used to calculate the score:      Age: 73 years      Sex: Male      Is Non- : No      Diabetic: No      Tobacco smoker: No      Systolic Blood Pressure: 135 mmHg      Is BP treated: Yes      HDL Cholesterol: 64 mg/dL      Total Cholesterol: 137 mg/dL    Fracture Risk Assessment Tool  Link to Frax Calculator  Use the information below to complete the Frax calculator  : 1950  Sex: male  Weight (kg): 63.5 kg (actual weight)  Height (cm): 166.8 cm  Previous Fragility Fracture:  No  History of parent with fractured hip:  No  Current Smoking:  No  Patient has been on glucocorticoids for more than 3 months (5mg/day or more): No  Rheumatoid Arthritis on Problem List:  No  Secondary Osteoporosis on Problem List:  No  Consumes 3 or more units of alcohol per day: No  Femoral Neck BMD  (g/cm2)            Reviewed and updated as needed this visit by Provider                    Past Medical History:   Diagnosis Date    Benign essential hypertension 08/16/2021    Sprain and strain of unspecified site of hip and thigh     Created by Conversion      Past Surgical History:   Procedure Laterality Date    HERNIORRHAPHY INGUINAL Right 9/5/2023    Procedure: HERNIORRHAPHY, INGUINAL, OPEN RIGHT;  Surgeon: Greg Delarosa DO;  Location: [facility] Main OR    ORTHOPEDIC SURGERY       BP Readings from Last 3 Encounters:   02/20/24 135/79   09/28/23 136/74   09/05/23 108/69    Wt Readings from Last 3 Encounters:   02/20/24 63.5 kg (140 lb)   09/28/23 64.4 kg (142 lb)   09/05/23 63.5 kg (140 lb)                  Patient Active Problem List   Diagnosis    Adenomatous colon polyp    Nicotine dependence    Carpal tunnel syndrome of left wrist    Benign essential hypertension    Pulmonary emphysema, unspecified emphysema type (H)    Former smoker    Abdominal aortic aneurysm (AAA) 30 to 34 mm in diameter    Atherosclerosis of abdominal aorta (H24)    Erectile dysfunction, unspecified erectile dysfunction type     Past Surgical History:   Procedure Laterality Date    HERNIORRHAPHY INGUINAL Right 9/5/2023    Procedure: HERNIORRHAPHY, INGUINAL, OPEN RIGHT;  Surgeon: Greg Delarosa DO;  Location: [facility] Main OR    ORTHOPEDIC SURGERY         Social History     Tobacco Use    Smoking status: Former     Packs/day: 1     Types: Cigarettes    Smokeless tobacco: Former     Quit date: 6/10/2016    Tobacco comments:     smoking since age 20 years old per pt  NO VAPING   Substance Use Topics    Alcohol use: Yes     Comment: Alcoholic Drinks/day: 3-5 beers per day     Family History   Problem Relation Age of Onset    No Known Problems Mother     No Known Problems Father     No Known Problems Maternal Grandmother     No Known Problems Maternal Grandfather     No Known Problems Paternal Grandmother     No Known  Problems Paternal Grandfather     No Known Problems Sister     No Known Problems Brother     No Known Problems Maternal Half-Brother     No Known Problems Maternal Half-Sister     No Known Problems Paternal Half-Brother     No Known Problems Paternal Half-Sister     No Known Problems Daughter     No Known Problems Son     No Known Problems Maternal Aunt     No Known Problems Maternal Uncle     No Known Problems Paternal Aunt     No Known Problems Paternal Uncle     No Known Problems Cousin     No Known Problems Other          Current Outpatient Medications   Medication Sig Dispense Refill    atorvastatin (LIPITOR) 40 MG tablet Take 1 tablet (40 mg) by mouth daily      ibuprofen (ADVIL,MOTRIN) 200 MG tablet [IBUPROFEN (ADVIL,MOTRIN) 200 MG TABLET] Take 200 mg by mouth every 6 (six) hours as needed for pain.      losartan (COZAAR) 50 MG tablet Take 1 tablet (50 mg) by mouth daily 90 tablet 2    sildenafil (REVATIO) 20 MG tablet [SILDENAFIL (REVATIO) 20 MG TABLET] 1-3 BY MOUTH 1 HOUR PRIOR TO SEX AS NEEDED 40 tablet 5    docusate sodium (COLACE) 100 MG capsule Take 1 capsule (100 mg) by mouth 2 times daily (Patient not taking: Reported on 2/20/2024) 30 capsule 0    HYDROcodone-acetaminophen (NORCO) 5-325 MG tablet Take 1-2 tablets by mouth every 4 hours as needed for moderate to severe pain (Patient not taking: Reported on 2/20/2024) 15 tablet 0    meclizine (ANTIVERT) 25 MG tablet Take 1 tablet (25 mg) by mouth 3 times daily as needed for dizziness (Patient not taking: Reported on 2/20/2024) 21 tablet 0     Allergies   Allergen Reactions    Penicillins     Cephalexin Hives and Rash    Cephalosporins Rash     Recent Labs   Lab Test 02/20/24  0900 10/02/23  0915 08/30/23  0917 02/14/23  0836 10/13/22  1036 02/14/22  0947 08/16/21  1153 04/20/21  0917   LDL 88  --   --  65  --  107  --  97   HDL 73  --   --  64  --  78  --  84   TRIG 57  --   --  40  --  54  --  53   ALT 25  --   --  20  --  20  --  21   CR 0.79 0.83   <  "> 0.78   < > 0.78   < > 0.78   GFRESTIMATED >90 >90   < > >90   < > >90   < > >60   GFRESTBLACK  --   --   --   --   --   --   --  >60   POTASSIUM 4.3 4.6   < > 4.5   < > 4.0   < > 4.6    < > = values in this interval not displayed.      Current providers sharing in care for this patient include:  Patient Care Team:  Lauren Rangel MD as PCP - General (Family Medicine)  Lauren Rangel MD as Assigned PCP  Rosalba Bean PA-C as Assigned Surgical Provider    The following health maintenance items are reviewed in Epic and correct as of today:  Health Maintenance   Topic Date Due    SPIROMETRY  Never done    COPD ACTION PLAN  Never done    ANNUAL REVIEW OF HM ORDERS  10/13/2023    LIPID  02/14/2024    MEDICARE ANNUAL WELLNESS VISIT  02/14/2024    LUNG CANCER SCREENING  06/14/2024    FALL RISK ASSESSMENT  02/20/2025    GLUCOSE  10/02/2026    DTAP/TDAP/TD IMMUNIZATION (3 - Td or Tdap) 10/11/2027    ADVANCE CARE PLANNING  02/15/2028    COLORECTAL CANCER SCREENING  07/18/2028    HEPATITIS C SCREENING  Completed    PHQ-2 (once per calendar year)  Completed    INFLUENZA VACCINE  Completed    Pneumococcal Vaccine: 65+ Years  Completed    ZOSTER IMMUNIZATION  Completed    RSV VACCINE (Pregnancy & 60+)  Completed    AORTIC ANEURYSM SCREENING (SYSTEM ASSIGNED)  Completed    COVID-19 Vaccine  Completed    IPV IMMUNIZATION  Aged Out    HPV IMMUNIZATION  Aged Out    MENINGITIS IMMUNIZATION  Aged Out    RSV MONOCLONAL ANTIBODY  Aged Out         Review of Systems  Constitutional, HEENT, cardiovascular, pulmonary, gi and gu systems are negative, except as otherwise noted.     Objective    Exam  /79 (BP Location: Left arm, Patient Position: Sitting, Cuff Size: Adult Regular)   Pulse 69   Temp 98.1  F (36.7  C) (Temporal)   Resp 16   Ht 1.668 m (5' 5.67\")   Wt 63.5 kg (140 lb)   SpO2 99%   BMI 22.82 kg/m     Estimated body mass index is 22.82 kg/m  as calculated from the following:    Height as of this encounter: 1.668 " "m (5' 5.67\").    Weight as of this encounter: 63.5 kg (140 lb).    Physical Exam  HENT:      Right Ear: Tympanic membrane and ear canal normal.      Left Ear: Tympanic membrane and ear canal normal.   Cardiovascular:      Rate and Rhythm: Normal rate and regular rhythm.      Heart sounds: Normal heart sounds.   Pulmonary:      Effort: Pulmonary effort is normal.      Breath sounds: Normal breath sounds.   Musculoskeletal:      Comments: Normal strength bilaterally in UE and LE   Neurological:      General: No focal deficit present.      Mental Status: He is alert.   Psychiatric:         Mood and Affect: Mood normal.               2/20/2024   Mini Cog   Clock Draw Score 0 Abnormal   3 Item Recall 2 objects recalled   Mini Cog Total Score 2              Ivelisse Caruso, MS3  [employer]    Signed Electronically by: Lauren Rangel MD  Prior to immunization administration, verified patients identity using patient s name and date of birth. Please see Immunization Activity for additional information.     Screening Questionnaire for Adult Immunization    Are you sick today?   No   Do you have allergies to medications, food, a vaccine component or latex?   Yes   Have you ever had a serious reaction after receiving a vaccination?   No   Do you have a long-term health problem with heart, lung, kidney, or metabolic disease (e.g., diabetes), asthma, a blood disorder, no spleen, complement component deficiency, a cochlear implant, or a spinal fluid leak?  Are you on long-term aspirin therapy?   No   Do you have cancer, leukemia, HIV/AIDS, or any other immune system problem?   No   Do you have a parent, brother, or sister with an immune system problem?   No   In the past 3 months, have you taken medications that affect  your immune system, such as prednisone, other steroids, or anticancer drugs; drugs for the treatment of rheumatoid arthritis, Crohn s disease, or psoriasis; or have you had radiation " treatments?   No   Have you had a seizure, or a brain or other nervous system problem?   No   During the past year, have you received a transfusion of blood or blood    products, or been given immune (gamma) globulin or antiviral drug?   No   For women: Are you pregnant or is there a chance you could become       pregnant during the next month?   NA   Have you received any vaccinations in the past 4 weeks?   No     Immunization questionnaire was positive for at least one answer.  Notified Dr. Rangel.      Patient instructed to remain in clinic for 15 minutes afterwards, and to report any adverse reactions.     Screening performed by Ayanna Butt MA on 2/20/2024 at 8:18 AM.

## 2024-02-20 NOTE — PATIENT INSTRUCTIONS
Lung Cancer Screening   Frequently Asked Questions  If you are at high-risk for lung cancer, getting screened with low-dose computed tomography (LDCT) every year can help save your life. This handout offers answers to some of the most common questions about lung cancer screening. If you have other questions, please call [phone] ([phone]).     What is it?  Lung cancer screening uses special X-ray technology to create an image of your lung tissue. The exam is quick and easy and takes less than 10 seconds. We don t give you any medicine or use any needles. You can eat before and after the exam. You don t need to change your clothes as long as the clothing on your chest doesn t contain metal. But, you do need to be able to hold your breath for at least 6 seconds during the exam.    What is the goal of lung cancer screening?  The goal of lung cancer screening is to save lives. Many times, lung cancer is not found until a person starts having physical symptoms. Lung cancer screening can help detect lung cancer in the earliest stages when it may be easier to treat.    Who should be screened for lung cancer?  We suggest lung cancer screening for anyone who is at high-risk for lung cancer. You are in the high-risk group if you:      are between the ages of 55 and 79, and    have smoked at least 1 pack of cigarettes a day for 20 or more years, and    still smoke or have quit within the past 15 years.    However, if you have a new cough or shortness of breath, you should talk to your doctor before being screened.    Why does it matter if I have symptoms?  Certain symptoms can be a sign that you have a condition in your lungs that should be checked and treated by your doctor. These symptoms include fever, chest pain, a new or changing cough, shortness of breath that you have never felt before, coughing up blood or unexplained weight loss. Having any of these symptoms can greatly affect the results of lung  cancer screening.       Should all smokers get an LDCT lung cancer screening exam?  It depends. Lung cancer screening is for a very specific group of men and women who have a history of heavy smoking over a long period of time (see  Who should be screened for lung cancer  above).  I am in the high-risk group, but have been diagnosed with cancer in the past. Is LDCT lung cancer screening right for me?  In some cases, you should not have LDCT lung screening, such as when your doctor is already following your cancer with CT scan studies. Your doctor will help you decide if LDCT lung screening is right for you.  Do I need to have a screening exam every year?  Yes. If you are in the high-risk group described earlier, you should get an LDCT lung cancer screening exam every year until you are 79, or are no longer willing or able to undergo screening and possible procedures to diagnose and treat lung cancer.  How effective is LDCT at preventing death from lung cancer?  Studies have shown that LDCT lung cancer screening can lower the risk of death from lung cancer by 20 percent in people who are at high-risk.  What are the risks?  There are some risks and limitations of LDCT lung cancer screening. We want to make sure you understand the risks and benefits, so please let us know if you have any questions. Your doctor may want to talk with you more about these risks.    Radiation exposure: As with any exam that uses radiation, there is a very small increased risk of cancer. The amount of radiation in LDCT is small--about the same amount a person would get from a mammogram. Your doctor orders the exam when he or she feels the potential benefits outweigh the risks.    False negatives: No test is perfect, including LDCT. It is possible that you may have a medical condition, including lung cancer, that is not found during your exam. This is called a false negative result.    False positives and more testing: LDCT very often finds  something in the lung that could be cancer, but in fact is not. This is called a false positive result. False positive tests often cause anxiety. To make sure these findings are not cancer, you may need to have more tests. These tests will be done only if you give us permission. Sometimes patients need a treatment that can have side effects, such as a biopsy. For more information on false positives, see  What can I expect from the results?     Findings not related to lung cancer: Your LDCT exam also takes pictures of areas of your body next to your lungs. In a very small number of cases, the CT scan will show an abnormal finding in one of these areas, such as your kidneys, adrenal glands, liver or thyroid. This finding may not be serious, but you may need more tests. Your doctor can help you decide what other tests you may need, if any.  What can I expect from the results?  About 1 out of 4 LDCT exams will find something that may need more tests. Most of the time, these findings are lung nodules. Lung nodules are very small collections of tissue in the lung. These nodules are very common, and the vast majority--more than 97 percent--are not cancer (benign). Most are normal lymph nodes or small areas of scarring from past infections.  But, if a small lung nodule is found to be cancer, the cancer can be cured more than 90 percent of the time. To know if the nodule is cancer, we may need to get more images before your next yearly screening exam. If the nodule has suspicious features (for example, it is large, has an odd shape or grows over time), we will refer you to a specialist for further testing.  Will my doctor also get the results?  Yes. Your doctor will get a copy of your results.  Is it okay to keep smoking now that there s a cancer screening exam?  No. Tobacco is one of the strongest cancer-causing agents. It causes not only lung cancer, but other cancers and cardiovascular (heart) diseases as well. The damage  caused by smoking builds over time. This means that the longer you smoke, the higher your risk of disease. While it is never too late to quit, the sooner you quit, the better.  Where can I find help to quit smoking?  The best way to prevent lung cancer is to stop smoking. If you have already quit smoking, congratulations and keep it up! For help on quitting smoking, please call [employer] at 1-310-QUITNOW (1-973.937.7151) or the American [employer] Society at 1-877.544.1253 to find local resources near you.  One-on-one health coaching:  If you d prefer to work individually with a health care provider on tobacco cessation, we offer:      Medication Therapy Management:  Our specially trained pharmacists work closely with you and your doctor to help you quit smoking.  Call [phone] or [phone] (toll free).    Preventive Care Advice   This is general advice given by our system to help you stay healthy. However, your care team may have specific advice just for you. Please talk to your care team about your preventive care needs.  Nutrition  Eat 5 or more servings of fruits and vegetables each day.  Try wheat bread, brown rice and whole grain pasta (instead of white bread, rice, and pasta).  Get enough calcium and vitamin D. Check the label on foods and aim for 100% of the RDA (recommended daily allowance).  Lifestyle  Exercise at least 150 minutes each week  (30 minutes a day, 5 days a week).  Do muscle strengthening activities 2 days a week. These help control your weight and prevent disease.  No smoking.  Wear sunscreen to prevent skin cancer.  Have a dental exam and cleaning every 6 months.  Yearly exams  See your health care team every year to talk about:  Any changes in your health.  Any medicines your care team has prescribed.  Preventive care, family planning, and ways to prevent chronic diseases.  Shots (vaccines)   HPV shots (up to age 26), if you've never had them before.  Hepatitis B shots (up to age 59),  if you've never had them before.  COVID-19 shot: Get this shot when it's due.  Flu shot: Get a flu shot every year.  Tetanus shot: Get a tetanus shot every 10 years.  Pneumococcal, hepatitis A, and RSV shots: Ask your care team if you need these based on your risk.  Shingles shot (for age 50 and up)  General health tests  Diabetes screening:  Starting at age 35, Get screened for diabetes at least every 3 years.  If you are younger than age 35, ask your care team if you should be screened for diabetes.  Cholesterol test: At age 39, start having a cholesterol test every 5 years, or more often if advised.  Bone density scan (DEXA): At age 50, ask your care team if you should have this scan for osteoporosis (brittle bones).  Hepatitis C: Get tested at least once in your life.  STIs (sexually transmitted infections)  Before age 24: Ask your care team if you should be screened for STIs.  After age 24: Get screened for STIs if you're at risk. You are at risk for STIs (including HIV) if:  You are sexually active with more than one person.  You don't use condoms every time.  You or a partner was diagnosed with a sexually transmitted infection.  If you are at risk for HIV, ask about PrEP medicine to prevent HIV.  Get tested for HIV at least once in your life, whether you are at risk for HIV or not.  Cancer screening tests  Cervical cancer screening: If you have a cervix, begin getting regular cervical cancer screening tests starting at age 21.  Breast cancer scan (mammogram): If you've ever had breasts, begin having regular mammograms starting at age 40. This is a scan to check for breast cancer.  Colon cancer screening: It is important to start screening for colon cancer at age 45.  Have a colonoscopy test every 10 years (or more often if you're at risk) Or, ask your provider about stool tests like a FIT test every year or Cologuard test every 3 years.  To learn more about your testing options, visit:    [email or web address].  For help making a decision, visit:   https://bit.ly/hd68864.  Prostate cancer screening test: If you have a prostate, ask your care team if a prostate cancer screening test (PSA) at age 55 is right for you.  Lung cancer screening: If you are a current or former smoker ages 50 to 80, ask your care team if ongoing lung cancer screenings are right for you.  For informational purposes only. Not to replace the advice of your health care provider. Copyright   2023 [place] [employer]. All rights reserved. Clinically reviewed by the [facility]. Pley 760923 - REV 01/24.    Preventing Falls: Care Instructions  Injuries and health problems such as trouble walking or poor eyesight can increase your risk of falling. So can some medicines. But there are things you can do to help prevent falls. You can exercise to get stronger. You can also arrange your home to make it safer.    Talk to your doctor about the medicines you take. Ask if any of them increase the risk of falls and whether they can be changed or stopped.   Try to exercise regularly. It can help improve your strength and balance. This can help lower your risk of falling.     Practice fall safety and prevention.    Wear low-heeled shoes that fit well and give your feet good support. Talk to your doctor if you have foot problems that make this hard.  Carry a cellphone or wear a medical alert device that you can use to call for help.  Use stepladders instead of chairs to reach high objects. Don't climb if you're at risk for falls. Ask for help, if needed.  Wear the correct eyeglasses, if you need them.    Make your home safer.    Remove rugs, cords, clutter, and furniture from walkways.  Keep your house well lit. Use night-lights in hallways and bathrooms.  Install and use sturdy handrails on stairways.  Wear nonskid footwear, even inside. Don't walk barefoot or in socks without shoes.    Be safe  "outside.    Use handrails, curb cuts, and ramps whenever possible.  Keep your hands free by using a shoulder bag or backpack.  Try to walk in well-lit areas. Watch out for uneven ground, changes in pavement, and debris.  Be careful in the winter. Walk on the grass or gravel when sidewalks are slippery. Use de-icer on steps and walkways. Add non-slip devices to shoes.    Put grab bars and nonskid mats in your shower or tub and near the toilet. Try to use a shower chair or bath bench when bathing.   Get into a tub or shower by putting in your weaker leg first. Get out with your strong side first. Have a phone or medical alert device in the bathroom with you.   Where can you learn more?  Go to https://www.[employer].net/patiented  Enter G117 in the search box to learn more about \"Preventing Falls: Care Instructions.\"  Current as of: July 18, 2023               Content Version: 13.8    5217-6500 [employer].   Care instructions adapted under license by your healthcare professional. If you have questions about a medical condition or this instruction, always ask your healthcare professional. [employer] disclaims any warranty or liability for your use of this information.      Learning About Stress  What is stress?     Stress is your body's response to a hard situation. Your body can have a physical, emotional, or mental response. Stress is a fact of life for most people, and it affects everyone differently. What causes stress for you may not be stressful for someone else.  A lot of things can cause stress. You may feel stress when you go on a job interview, take a test, or run a race. This kind of short-term stress is normal and even useful. It can help you if you need to work hard or react quickly. For example, stress can help you finish an important job on time.  Long-term stress is caused by ongoing stressful situations or events. Examples of long-term stress include long-term health " problems, ongoing problems at work, or conflicts in your family. Long-term stress can harm your health.  How does stress affect your health?  When you are stressed, your body responds as though you are in danger. It makes hormones that speed up your heart, make you breathe faster, and give you a burst of energy. This is called the fight-or-flight stress response. If the stress is over quickly, your body goes back to normal and no harm is done.  But if stress happens too often or lasts too long, it can have bad effects. Long-term stress can make you more likely to get sick, and it can make symptoms of some diseases worse. If you tense up when you are stressed, you may develop neck, shoulder, or low back pain. Stress is linked to high blood pressure and heart disease.  Stress also harms your emotional health. It can make you phelps, tense, or depressed. Your relationships may suffer, and you may not do well at work or school.  What can you do to manage stress?  You can try these things to help manage stress:   Do something active. Exercise or activity can help reduce stress. Walking is a great way to get started. Even everyday activities such as housecleaning or yard work can help.  Try yoga or geraldine chi. These techniques combine exercise and meditation. You may need some training at first to learn them.  Do something you enjoy. For example, listen to music or go to a movie. Practice your hobby or do volunteer work.  Meditate. This can help you relax, because you are not worrying about what happened before or what may happen in the future.  Do guided imagery. Imagine yourself in any setting that helps you feel calm. You can use online videos, books, or a teacher to guide you.  Do breathing exercises. For example:  From a standing position, bend forward from the waist with your knees slightly bent. Let your arms dangle close to the floor.  Breathe in slowly and deeply as you return to a standing position. Roll up slowly  "and lift your head last.  Hold your breath for just a few seconds in the standing position.  Breathe out slowly and bend forward from the waist.  Let your feelings out. Talk, laugh, cry, and express anger when you need to. Talking with supportive friends or family, a counselor, or a catrachita leader about your feelings is a healthy way to relieve stress. Avoid discussing your feelings with people who make you feel worse.  Write. It may help to write about things that are bothering you. This helps you find out how much stress you feel and what is causing it. When you know this, you can find better ways to cope.  What can you do to prevent stress?  You might try some of these things to help prevent stress:  Manage your time. This helps you find time to do the things you want and need to do.  Get enough sleep. Your body recovers from the stresses of the day while you are sleeping.  Get support. Your family, friends, and community can make a difference in how you experience stress.  Limit your news feed. Avoid or limit time on social media or news that may make you feel stressed.  Do something active. Exercise or activity can help reduce stress. Walking is a great way to get started.  Where can you learn more?  Go to https://www.[employer].net/patiented  Enter N032 in the search box to learn more about \"Learning About Stress.\"  Current as of: February 26, 2023               Content Version: 13.8    0699-5442 [employer].   Care instructions adapted under license by your healthcare professional. If you have questions about a medical condition or this instruction, always ask your healthcare professional. [employer] disclaims any warranty or liability for your use of this information.      Learning About Alcohol Use Disorder  What is alcohol use disorder?  Alcohol use disorder means that a person drinks alcohol even though it causes harm to themselves or others. It can range from mild to severe. The " more symptoms of this disorder you have, the more severe it may be. People who have it may find it hard to control their use of alcohol.  People who have this disorder may argue with others about how much they're drinking. Their job may be affected because of drinking. They may drink when it's dangerous or illegal, such as when they drive. They also may have a strong need, or craving, to drink. They may feel like they must drink just to get by. Their drinking may increase their risk of getting hurt or being in a car crash.  Over time, drinking too much alcohol may cause health problems. These may include high blood pressure, liver problems, or problems with digestion.  What are the symptoms?  Maybe you've wondered about your alcohol habits or how to tell if your drinking is becoming a problem.  Here are some of the symptoms of alcohol use disorder. You may have it if you have two or more of the following symptoms:  You drink larger amounts of alcohol than you ever meant to. Or you've been drinking for a longer time than you ever meant to.  You can't cut down or control your use. Or you constantly wish you could cut down.  You spend a lot of time getting or drinking alcohol or recovering from its effects.  You have strong cravings for alcohol.  You can no longer do your main jobs at work, at school, or at home.  You keep drinking alcohol, even though your use hurts your relationships.  You have stopped doing important activities because of your alcohol use.  You drink alcohol in situations where doing so is dangerous.  You keep drinking alcohol even though you know it's causing health problems.  You need more and more alcohol to get the same effect, or you get less effect from the same amount over time. This is called tolerance.  You have uncomfortable symptoms when you stop drinking alcohol or use less. This is called withdrawal.  Alcohol use disorder can range from mild to severe. The more symptoms you have, the  "more severe the disorder may be.  You might not realize that your drinking is a problem. You might not drink large amounts when you drink. Or you might go for days or weeks between drinking episodes. But even if you don't drink very often, your drinking could still be harmful and put you at risk.  How is alcohol use disorder treated?  Getting help is up to you. But you don't have to do it alone. There are many people and kinds of treatments that can help.  Treatment for alcohol use disorder can include:  Group therapy, one or more types of counseling, and alcohol education.  Medicines that help to:  Reduce withdrawal symptoms and help you safely stop drinking.  Reduce cravings for alcohol.  Support groups. These groups include Alcoholics Anonymous and [facility] (Self-Management and Recovery Training).  Some people are able to stop or cut back on drinking with help from a counselor. People who have moderate to severe alcohol use disorder may need medical treatment. They may need to stay in a hospital or treatment center.  You may have a treatment team to help you. This team may include a psychologist or psychiatrist, counselors, doctors, social workers, nurses, and a . A  helps plan and manage your treatment.  Follow-up care is a key part of your treatment and safety. Be sure to make and go to all appointments, and call your doctor if you are having problems. It's also a good idea to know your test results and keep a list of the medicines you take.  Where can you learn more?  Go to https://www.[employer].net/patiented  Enter H758 in the search box to learn more about \"Learning About Alcohol Use Disorder.\"  Current as of: March 21, 2023               Content Version: 13.8    6325-4955 [employer].   Care instructions adapted under license by your healthcare professional. If you have questions about a medical condition or this instruction, always ask your healthcare " professional. [employer] [facility] disclaims any warranty or liability for your use of this information.      Substance Use Disorder: Care Instructions  Overview     You can improve your life and health by stopping your use of alcohol or drugs. When you don't drink or use drugs, you may feel and sleep better. You may get along better with your family, friends, and coworkers. There are medicines and programs that can help with substance use disorder.  How can you care for yourself at home?  Here are some ways to help you stay sober and prevent relapse.  If you have been given medicine to help keep you sober or reduce your cravings, be sure to take it exactly as prescribed.  Talk to your doctor about programs that can help you stop using drugs or drinking alcohol.  Do not keep alcohol or drugs in your home.  Plan ahead. Think about what you'll say if other people ask you to drink or use drugs. Try not to spend time with people who drink or use drugs.  Use the time and money spent on drinking or drugs to do something that's important to you.  Preventing a relapse  Have a plan to deal with relapse. Learn to recognize changes in your thinking that lead you to drink or use drugs. Get help before you start to drink or use drugs again.  Try to stay away from situations, friends, or places that may lead you to drink or use drugs.  If you feel the need to drink alcohol or use drugs again, seek help right away. Call a trusted friend or family member. Some people get support from organizations such as Narcotics Anonymous or [employer] or from treatment facilities.  If you relapse, get help as soon as you can. Some people make a plan with another person that outlines what they want that person to do for them if they relapse. The plan usually includes how to handle the relapse and who to notify in case of relapse.  Don't give up. Remember that a relapse doesn't mean that you have failed. Use the experience to learn the  "triggers that lead you to drink or use drugs. Then quit again. Recovery is a lifelong process. Many people have several relapses before they are able to quit for good.  Follow-up care is a key part of your treatment and safety. Be sure to make and go to all appointments, and call your doctor if you are having problems. It's also a good idea to know your test results and keep a list of the medicines you take.  When should you call for help?   Call 911  anytime you think you may need emergency care. For example, call if you or someone else:    Has overdosed or has withdrawal signs. Be sure to tell the emergency workers that you are or someone else is using or trying to quit using drugs. Overdose or withdrawal signs may include:  Losing consciousness.  Seizure.  Seeing or hearing things that aren't there (hallucinations).     Is thinking or talking about suicide or harming others.   Where to get help 24 hours a day, 7 days a week   If you or someone you know talks about suicide, self-harm, a mental health crisis, a substance use crisis, or any other kind of emotional distress, get help right away. You can:    Call the Suicide and Crisis Lifeline at 988.     Call 5-836-684-TALK (1-773.697.2759).     Text HOME to 679039 to access the Crisis Text Line.   Consider saving these numbers in your phone.  Go to [employer] for more information or to chat online.  Call your doctor now or seek immediate medical care if:    You are having withdrawal symptoms. These may include nausea or vomiting, sweating, shakiness, and anxiety.   Watch closely for changes in your health, and be sure to contact your doctor if:    You have a relapse.     You need more help or support to stop.   Where can you learn more?  Go to https://www.healthwise.net/patiented  Enter H573 in the search box to learn more about \"Substance Use Disorder: Care Instructions.\"  Current as of: March 21, 2023               Content Version: 13.8    9517-5785 " Healthwise, [employer].   Care instructions adapted under license by your healthcare professional. If you have questions about a medical condition or this instruction, always ask your healthcare professional. Healthwise[employer] disclaims any warranty or liability for your use of this information.

## 2024-06-17 ENCOUNTER — HOSPITAL ENCOUNTER (OUTPATIENT)
Dept: CT IMAGING | Facility: HOSPITAL | Age: 74
Discharge: HOME | End: 2024-06-17
Attending: FAMILY MEDICINE | Admitting: FAMILY MEDICINE
Payer: COMMERCIAL

## 2024-06-17 DIAGNOSIS — R91.8 PULMONARY NODULES: ICD-10-CM

## 2024-06-17 DIAGNOSIS — Z87.891 PERSONAL HISTORY OF NICOTINE DEPENDENCE: ICD-10-CM

## 2024-06-17 DIAGNOSIS — I25.10 CORONARY ARTERY CALCIFICATION SEEN ON CAT SCAN: Primary | ICD-10-CM

## 2024-06-17 PROCEDURE — 71271 CT THORAX LUNG CANCER SCR C-: CPT

## 2024-08-06 DIAGNOSIS — I70.0 ATHEROSCLEROSIS OF ABDOMINAL AORTA (H): ICD-10-CM

## 2024-08-06 RX ORDER — ATORVASTATIN CALCIUM 40 MG/1
40 TABLET, FILM COATED ORAL DAILY
Qty: 90 TABLET | Refills: 0 | Status: SHIPPED | OUTPATIENT
Start: 2024-08-06

## 2024-09-15 DIAGNOSIS — N52.9 ERECTILE DYSFUNCTION, UNSPECIFIED ERECTILE DYSFUNCTION TYPE: ICD-10-CM

## 2024-09-16 RX ORDER — SILDENAFIL CITRATE 20 MG/1
TABLET ORAL
Qty: 40 TABLET | Refills: 2 | Status: SHIPPED | OUTPATIENT
Start: 2024-09-16

## 2024-10-14 DIAGNOSIS — R03.0 ELEVATED BP WITHOUT DIAGNOSIS OF HYPERTENSION: ICD-10-CM

## 2024-10-14 DIAGNOSIS — I70.0 ATHEROSCLEROSIS OF ABDOMINAL AORTA (H): ICD-10-CM

## 2024-10-14 RX ORDER — ATORVASTATIN CALCIUM 40 MG/1
40 TABLET, FILM COATED ORAL DAILY
Qty: 90 TABLET | Refills: 0 | Status: SHIPPED | OUTPATIENT
Start: 2024-10-14

## 2024-10-14 RX ORDER — LOSARTAN POTASSIUM 50 MG/1
50 TABLET ORAL DAILY
Qty: 90 TABLET | Refills: 0 | OUTPATIENT
Start: 2024-10-14

## 2024-11-09 DIAGNOSIS — R03.0 ELEVATED BP WITHOUT DIAGNOSIS OF HYPERTENSION: ICD-10-CM

## 2024-11-09 DIAGNOSIS — I70.0 ATHEROSCLEROSIS OF ABDOMINAL AORTA (H): ICD-10-CM

## 2024-11-11 RX ORDER — LOSARTAN POTASSIUM 50 MG/1
50 TABLET ORAL DAILY
Qty: 90 TABLET | Refills: 0 | Status: SHIPPED | OUTPATIENT
Start: 2024-11-11

## 2025-01-18 DIAGNOSIS — I70.0 ATHEROSCLEROSIS OF ABDOMINAL AORTA: ICD-10-CM

## 2025-01-20 RX ORDER — ATORVASTATIN CALCIUM 40 MG/1
40 TABLET, FILM COATED ORAL DAILY
Qty: 90 TABLET | Refills: 0 | Status: SHIPPED | OUTPATIENT
Start: 2025-01-20

## 2025-02-24 ASSESSMENT — SOCIAL DETERMINANTS OF HEALTH (SDOH): HOW OFTEN DO YOU GET TOGETHER WITH FRIENDS OR RELATIVES?: PATIENT DECLINED

## 2025-03-03 ENCOUNTER — OFFICE VISIT (OUTPATIENT)
Dept: FAMILY MEDICINE | Facility: CLINIC | Age: 75
End: 2025-03-03
Payer: COMMERCIAL

## 2025-03-03 VITALS
HEART RATE: 71 BPM | SYSTOLIC BLOOD PRESSURE: 129 MMHG | RESPIRATION RATE: 16 BRPM | WEIGHT: 146 LBS | TEMPERATURE: 97.3 F | DIASTOLIC BLOOD PRESSURE: 72 MMHG | BODY MASS INDEX: 23.46 KG/M2 | HEIGHT: 66 IN | OXYGEN SATURATION: 98 %

## 2025-03-03 DIAGNOSIS — Z87.891 PERSONAL HISTORY OF TOBACCO USE: ICD-10-CM

## 2025-03-03 DIAGNOSIS — Z12.5 SCREENING FOR PROSTATE CANCER: ICD-10-CM

## 2025-03-03 DIAGNOSIS — J43.9 PULMONARY EMPHYSEMA, UNSPECIFIED EMPHYSEMA TYPE (H): ICD-10-CM

## 2025-03-03 DIAGNOSIS — I10 BENIGN ESSENTIAL HYPERTENSION: ICD-10-CM

## 2025-03-03 DIAGNOSIS — Z00.00 ENCOUNTER FOR MEDICARE ANNUAL WELLNESS EXAM: Primary | ICD-10-CM

## 2025-03-03 DIAGNOSIS — R35.1 NOCTURIA: ICD-10-CM

## 2025-03-03 DIAGNOSIS — E87.1 HYPONATREMIA: ICD-10-CM

## 2025-03-03 DIAGNOSIS — I70.0 ATHEROSCLEROSIS OF ABDOMINAL AORTA: ICD-10-CM

## 2025-03-03 DIAGNOSIS — I25.10 CORONARY ARTERY CALCIFICATION SEEN ON CAT SCAN: ICD-10-CM

## 2025-03-03 DIAGNOSIS — R41.3 MEMORY LOSS: ICD-10-CM

## 2025-03-03 LAB
ALBUMIN UR-MCNC: NEGATIVE MG/DL
ANION GAP SERPL CALCULATED.3IONS-SCNC: 10 MMOL/L (ref 7–15)
APPEARANCE UR: CLEAR
BILIRUB UR QL STRIP: NEGATIVE
BUN SERPL-MCNC: 19.6 MG/DL (ref 8–23)
CALCIUM SERPL-MCNC: 9.1 MG/DL (ref 8.8–10.4)
CHLORIDE SERPL-SCNC: 97 MMOL/L (ref 98–107)
CHOLEST SERPL-MCNC: 146 MG/DL
COLOR UR AUTO: YELLOW
CREAT SERPL-MCNC: 0.81 MG/DL (ref 0.67–1.17)
EGFRCR SERPLBLD CKD-EPI 2021: >90 ML/MIN/1.73M2
FASTING STATUS PATIENT QL REPORTED: NO
FASTING STATUS PATIENT QL REPORTED: NO
GLUCOSE SERPL-MCNC: 103 MG/DL (ref 70–99)
GLUCOSE UR STRIP-MCNC: NEGATIVE MG/DL
HCO3 SERPL-SCNC: 25 MMOL/L (ref 22–29)
HDLC SERPL-MCNC: 72 MG/DL
HGB UR QL STRIP: NEGATIVE
KETONES UR STRIP-MCNC: NEGATIVE MG/DL
LDLC SERPL CALC-MCNC: 65 MG/DL
LEUKOCYTE ESTERASE UR QL STRIP: NEGATIVE
NITRATE UR QL: NEGATIVE
NONHDLC SERPL-MCNC: 74 MG/DL
OSMOLALITY UR: 503 MMOL/KG (ref 100–1200)
PH UR STRIP: 7 [PH] (ref 5–8)
POTASSIUM SERPL-SCNC: 4.2 MMOL/L (ref 3.4–5.3)
PSA SERPL DL<=0.01 NG/ML-MCNC: 0.36 NG/ML (ref 0–6.5)
SODIUM SERPL-SCNC: 132 MMOL/L (ref 135–145)
SP GR UR STRIP: 1.01 (ref 1–1.03)
TRIGL SERPL-MCNC: 45 MG/DL
UROBILINOGEN UR STRIP-ACNC: 0.2 E.U./DL

## 2025-03-03 PROCEDURE — G2211 COMPLEX E/M VISIT ADD ON: HCPCS | Performed by: FAMILY MEDICINE

## 2025-03-03 PROCEDURE — G0103 PSA SCREENING: HCPCS | Performed by: FAMILY MEDICINE

## 2025-03-03 PROCEDURE — 83935 ASSAY OF URINE OSMOLALITY: CPT | Performed by: FAMILY MEDICINE

## 2025-03-03 PROCEDURE — 81003 URINALYSIS AUTO W/O SCOPE: CPT | Performed by: FAMILY MEDICINE

## 2025-03-03 PROCEDURE — 36415 COLL VENOUS BLD VENIPUNCTURE: CPT | Performed by: FAMILY MEDICINE

## 2025-03-03 PROCEDURE — 80061 LIPID PANEL: CPT | Performed by: FAMILY MEDICINE

## 2025-03-03 PROCEDURE — G0296 VISIT TO DETERM LDCT ELIG: HCPCS | Performed by: FAMILY MEDICINE

## 2025-03-03 PROCEDURE — 99214 OFFICE O/P EST MOD 30 MIN: CPT | Mod: 25 | Performed by: FAMILY MEDICINE

## 2025-03-03 PROCEDURE — 80048 BASIC METABOLIC PNL TOTAL CA: CPT | Performed by: FAMILY MEDICINE

## 2025-03-03 PROCEDURE — G0439 PPPS, SUBSEQ VISIT: HCPCS | Performed by: FAMILY MEDICINE

## 2025-03-03 PROCEDURE — 3074F SYST BP LT 130 MM HG: CPT | Performed by: FAMILY MEDICINE

## 2025-03-03 PROCEDURE — 3078F DIAST BP <80 MM HG: CPT | Performed by: FAMILY MEDICINE

## 2025-03-03 RX ORDER — ATORVASTATIN CALCIUM 40 MG/1
40 TABLET, FILM COATED ORAL DAILY
Qty: 90 TABLET | Refills: 2 | Status: SHIPPED | OUTPATIENT
Start: 2025-03-03

## 2025-03-03 RX ORDER — LOSARTAN POTASSIUM 50 MG/1
50 TABLET ORAL DAILY
Qty: 90 TABLET | Refills: 2 | Status: SHIPPED | OUTPATIENT
Start: 2025-03-03

## 2025-03-03 NOTE — PROGRESS NOTES
Prior to immunization administration, verified patients identity using patient s name and date of birth. Please see Immunization Activity for additional information.     Screening Questionnaire for Adult Immunization    Are you sick today?   No   Do you have allergies to medications, food, a vaccine component or latex?   No   Have you ever had a serious reaction after receiving a vaccination?   No   Do you have a long-term health problem with heart, lung, kidney, or metabolic disease (e.g., diabetes), asthma, a blood disorder, no spleen, complement component deficiency, a cochlear implant, or a spinal fluid leak?  Are you on long-term aspirin therapy?   Yes   Do you have cancer, leukemia, HIV/AIDS, or any other immune system problem?   No   Do you have a parent, brother, or sister with an immune system problem?   No   In the past 3 months, have you taken medications that affect  your immune system, such as prednisone, other steroids, or anticancer drugs; drugs for the treatment of rheumatoid arthritis, Crohn s disease, or psoriasis; or have you had radiation treatments?   No   Have you had a seizure, or a brain or other nervous system problem?   No   During the past year, have you received a transfusion of blood or blood    products, or been given immune (gamma) globulin or antiviral drug?   No   For women: Are you pregnant or is there a chance you could become       pregnant during the next month?   No   Have you received any vaccinations in the past 4 weeks?   No     Immunization questionnaire was positive for at least one answer.  Notified provider.      Patient instructed to remain in clinic for 15 minutes afterwards, and to report any adverse reactions.     Screening performed by Ayanna Butt MA on 3/3/2025 at 8:00 AM.

## 2025-03-03 NOTE — PATIENT INSTRUCTIONS
Patient Education   Preventive Care Advice   This is general advice given by our system to help you stay healthy. However, your care team may have specific advice just for you. Please talk to your care team about your preventive care needs.  Nutrition  Eat 5 or more servings of fruits and vegetables each day.  Try wheat bread, brown rice and whole grain pasta (instead of white bread, rice, and pasta).  Get enough calcium and vitamin D. Check the label on foods and aim for 100% of the RDA (recommended daily allowance).  Lifestyle  Exercise at least 150 minutes each week  (30 minutes a day, 5 days a week).  Do muscle strengthening activities 2 days a week. These help control your weight and prevent disease.  No smoking.  Wear sunscreen to prevent skin cancer.  Have a dental exam and cleaning every 6 months.  Yearly exams  See your health care team every year to talk about:  Any changes in your health.  Any medicines your care team has prescribed.  Preventive care, family planning, and ways to prevent chronic diseases.  Shots (vaccines)   HPV shots (up to age 26), if you've never had them before.  Hepatitis B shots (up to age 59), if you've never had them before.  COVID-19 shot: Get this shot when it's due.  Flu shot: Get a flu shot every year.  Tetanus shot: Get a tetanus shot every 10 years.  Pneumococcal, hepatitis A, and RSV shots: Ask your care team if you need these based on your risk.  Shingles shot (for age 50 and up)  General health tests  Diabetes screening:  Starting at age 35, Get screened for diabetes at least every 3 years.  If you are younger than age 35, ask your care team if you should be screened for diabetes.  Cholesterol test: At age 39, start having a cholesterol test every 5 years, or more often if advised.  Bone density scan (DEXA): At age 50, ask your care team if you should have this scan for osteoporosis (brittle bones).  Hepatitis C: Get tested at least once in your life.  STIs (sexually  transmitted infections)  Before age 24: Ask your care team if you should be screened for STIs.  After age 24: Get screened for STIs if you're at risk. You are at risk for STIs (including HIV) if:  You are sexually active with more than one person.  You don't use condoms every time.  You or a partner was diagnosed with a sexually transmitted infection.  If you are at risk for HIV, ask about PrEP medicine to prevent HIV.  Get tested for HIV at least once in your life, whether you are at risk for HIV or not.  Cancer screening tests  Cervical cancer screening: If you have a cervix, begin getting regular cervical cancer screening tests starting at age 21.  Breast cancer scan (mammogram): If you've ever had breasts, begin having regular mammograms starting at age 40. This is a scan to check for breast cancer.  Colon cancer screening: It is important to start screening for colon cancer at age 45.  Have a colonoscopy test every 10 years (or more often if you're at risk) Or, ask your provider about stool tests like a FIT test every year or Cologuard test every 3 years.  To learn more about your testing options, visit:   .  For help making a decision, visit:   https://bit.ly/qy16540.  Prostate cancer screening test: If you have a prostate, ask your care team if a prostate cancer screening test (PSA) at age 55 is right for you.  Lung cancer screening: If you are a current or former smoker ages 50 to 80, ask your care team if ongoing lung cancer screenings are right for you.  For informational purposes only. Not to replace the advice of your health care provider. Copyright   2023 [facility]. All rights reserved. Clinically reviewed by the [facility] Transitions Program. [employer] [id number] - REV 01/24.  9 Ways to Cut Back on Drinking  Maybe you've found yourself drinking more alcohol than you'd prefer. If you want to cut back, here are some ideas to try.    Think before you drink.  Do you really want a drink,  "or is it just a habit? If you're used to having a drink at a certain time, try doing something else then.     Look for substitutes.  Find some no-alcohol drinks that you enjoy, like flavored seltzer water, tea with honey, or tonic with a slice of lime. Or try alcohol-free beer or \"virgin\" cocktails (without the alcohol).     Drink more water.  Use water to quench your thirst. Drink a glass of water before you have any alcohol. Have another glass along with every drink or between drinks.     Shrink your drink.  For example, have a bottle of beer instead of a pint. Use a smaller glass for wine. Choose drinks with lower alcohol content (ABV%). Or use less liquor and more mixer in cocktails.     Slow down.  It's easy to drink quickly and without thinking about it. Pay attention, and make each drink last longer.     Do the math.  Total up how much you spend on alcohol each month. How much is that a year? If you cut back, what could you do with the money you save?     Take a break.  Choose a day or two each week when you won't drink at all. Notice how you feel on those days, physically and emotionally. How did you sleep? Do you feel better? Over time, add more break days.     Count calories.  Would you like to lose some weight? For some people that's a good motivator for cutting back. Figure out how many calories are in each drink. How many does that add up to in a day? In a week? In a month?     Practice saying no.  Be ready when someone offers you a drink. Try: \"Thanks, I've had enough.\" Or \"Thanks, but I'm cutting back.\" Or \"No, thanks. I feel better when I drink less.\"   Current as of: November 15, 2023  Content Version: 14.3    2024 [employer].   Care instructions adapted under license by your healthcare professional. If you have questions about a medical condition or this instruction, always ask your healthcare professional. [employer] disclaims any warranty or liability for your use of this " information.     Lung Cancer Screening   Frequently Asked Questions  If you are at high-risk for lung cancer, getting screened with low-dose computed tomography (LDCT) every year can help save your life. This handout offers answers to some of the most common questions about lung cancer screening. If you have other questions, please call [phone] ([phone]).     What is it?  Lung cancer screening uses special X-ray technology to create an image of your lung tissue. The exam is quick and easy and takes less than 10 seconds. We don t give you any medicine or use any needles. You can eat before and after the exam. You don t need to change your clothes as long as the clothing on your chest doesn t contain metal. But, you do need to be able to hold your breath for at least 6 seconds during the exam.    What is the goal of lung cancer screening?  The goal of lung cancer screening is to save lives. Many times, lung cancer is not found until a person starts having physical symptoms. Lung cancer screening can help detect lung cancer in the earliest stages when it may be easier to treat.    Who should be screened for lung cancer?  We suggest lung cancer screening for anyone who is at high-risk for lung cancer. You are in the high-risk group if you:      are between the ages of 55 and 79, and    have smoked at least 1 pack of cigarettes a day for 20 or more years, and    still smoke or have quit within the past 15 years.    However, if you have a new cough or shortness of breath, you should talk to your doctor before being screened.    Why does it matter if I have symptoms?  Certain symptoms can be a sign that you have a condition in your lungs that should be checked and treated by your doctor. These symptoms include fever, chest pain, a new or changing cough, shortness of breath that you have never felt before, coughing up blood or unexplained weight loss. Having any of these symptoms can greatly affect the  results of lung cancer screening.       Should all smokers get an LDCT lung cancer screening exam?  It depends. Lung cancer screening is for a very specific group of men and women who have a history of heavy smoking over a long period of time (see  Who should be screened for lung cancer  above).  I am in the high-risk group, but have been diagnosed with cancer in the past. Is LDCT lung cancer screening right for me?  In some cases, you should not have LDCT lung screening, such as when your doctor is already following your cancer with CT scan studies. Your doctor will help you decide if LDCT lung screening is right for you.  Do I need to have a screening exam every year?  Yes. If you are in the high-risk group described earlier, you should get an LDCT lung cancer screening exam every year until you are 79, or are no longer willing or able to undergo screening and possible procedures to diagnose and treat lung cancer.  How effective is LDCT at preventing death from lung cancer?  Studies have shown that LDCT lung cancer screening can lower the risk of death from lung cancer by 20 percent in people who are at high-risk.  What are the risks?  There are some risks and limitations of LDCT lung cancer screening. We want to make sure you understand the risks and benefits, so please let us know if you have any questions. Your doctor may want to talk with you more about these risks.    Radiation exposure: As with any exam that uses radiation, there is a very small increased risk of cancer. The amount of radiation in LDCT is small--about the same amount a person would get from a mammogram. Your doctor orders the exam when he or she feels the potential benefits outweigh the risks.    False negatives: No test is perfect, including LDCT. It is possible that you may have a medical condition, including lung cancer, that is not found during your exam. This is called a false negative result.    False positives and more testing: LDCT  very often finds something in the lung that could be cancer, but in fact is not. This is called a false positive result. False positive tests often cause anxiety. To make sure these findings are not cancer, you may need to have more tests. These tests will be done only if you give us permission. Sometimes patients need a treatment that can have side effects, such as a biopsy. For more information on false positives, see  What can I expect from the results?     Findings not related to lung cancer: Your LDCT exam also takes pictures of areas of your body next to your lungs. In a very small number of cases, the CT scan will show an abnormal finding in one of these areas, such as your kidneys, adrenal glands, liver or thyroid. This finding may not be serious, but you may need more tests. Your doctor can help you decide what other tests you may need, if any.  What can I expect from the results?  About 1 out of 4 LDCT exams will find something that may need more tests. Most of the time, these findings are lung nodules. Lung nodules are very small collections of tissue in the lung. These nodules are very common, and the vast majority--more than 97 percent--are not cancer (benign). Most are normal lymph nodes or small areas of scarring from past infections.  But, if a small lung nodule is found to be cancer, the cancer can be cured more than 90 percent of the time. To know if the nodule is cancer, we may need to get more images before your next yearly screening exam. If the nodule has suspicious features (for example, it is large, has an odd shape or grows over time), we will refer you to a specialist for further testing.  Will my doctor also get the results?  Yes. Your doctor will get a copy of your results.  Is it okay to keep smoking now that there s a cancer screening exam?  No. Tobacco is one of the strongest cancer-causing agents. It causes not only lung cancer, but other cancers and cardiovascular (heart) diseases as  well. The damage caused by smoking builds over time. This means that the longer you smoke, the higher your risk of disease. While it is never too late to quit, the sooner you quit, the better.  Where can I find help to quit smoking?  The best way to prevent lung cancer is to stop smoking. If you have already quit smoking, congratulations and keep it up! For help on quitting smoking, please call [employer] at 9-769-QUITNOW (1-111.814.6312) or the American [employer] Society at 1-799.139.2509 to find local resources near you.  One-on-one health coaching:  If you d prefer to work individually with a health care provider on tobacco cessation, we offer:      Medication Therapy Management:  Our specially trained pharmacists work closely with you and your doctor to help you quit smoking.  Call [phone] or [phone] (toll free).

## 2025-03-03 NOTE — PROGRESS NOTES
Lung Cancer Screening Shared Decision Making Visit     Rip Crockett, a 74 year old male, is eligible for lung cancer screening    History   Smoking Status     Former     Types: Cigarettes   Smokeless Tobacco     Former     Quit date: 6/10/2016       I have discussed with patient the risks and benefits of screening for lung cancer with low-dose CT.     The risks include:    radiation exposure: one low dose chest CT has as much ionizing radiation as about 15 chest x-rays, or 6 months of background radiation living in [place]      false positives: most findings/nodules are NOT cancer, but some might still require additional diagnostic evaluation, including biopsy    over-diagnosis: some slow growing cancers that might never have been clinically significant will be detected and treated unnecessarily     The benefit of early detection of lung cancer is contingent upon adherence to annual screening or more frequent follow up if indicated.     Furthermore, to benefit from screening, Rip must be willing and able to undergo diagnostic procedures, if indicated. Although no specific guide is available for determining severity of comorbidities, it is reasonable to withhold screening in patients who have greater mortality risk from other diseases.     We did discuss that the best way to prevent lung cancer is to not smoke.    Some patients may value a numeric estimation of lung cancer risk when evaluating if lung cancer screening is right for them, here is one calculator:    ShouldIScreen

## 2025-03-03 NOTE — PROGRESS NOTES
Preventive Care Visit  [facility]  Lauren Rangel MD, Family Medicine  Mar 3, 2025      Assessment & Plan     Encounter for Medicare annual wellness exam      Benign essential hypertension  Stable on losartan 50 mg daily, continue healthy lifestyle changes, continue current management.  - BASIC METABOLIC PANEL; Future  - losartan (COZAAR) 50 MG tablet; Take 1 tablet (50 mg) by mouth daily.  - Urine Macroscopic with reflex to Microscopic; Future  - BASIC METABOLIC PANEL  - Urine Macroscopic with reflex to Microscopic    Coronary artery calcification seen on CAT scan  No symptoms, discussed importance of tight control of risk factor included cholesterol, continue atorvastatin 40 mg daily.  - Lipid panel reflex to direct LDL Non-fasting; Future  - atorvastatin (LIPITOR) 40 MG tablet; Take 1 tablet (40 mg) by mouth daily.  - US Aorta Medicare AAA Screening; Future  - Lipid panel reflex to direct LDL Non-fasting    Pulmonary emphysema, unspecified emphysema type (H)  Incidental finding, no recent exacerbation, schedule a baseline spirometry.  Was referred last year but did not follow-up.  - Spirometry, Breathing Capacity, Normal Order, Clinic Performed; Future    Atherosclerosis of abdominal aorta  Small aneurysm, due for for 3 years follow-up ultrasound, new order placed.  Continue statin therapy.  - atorvastatin (LIPITOR) 40 MG tablet; Take 1 tablet (40 mg) by mouth daily.  - US Aorta Medicare AAA Screening; Future    Memory loss  - Adult Neurology  Referral; Future    Hyponatremia  Likely related to SIADH, discussed fluid restriction.  Monitor sodium level.  - Osmolality urine; Future  - Osmolality urine    Screening for prostate cancer    - PSA, tumor marker; Future  - PSA, tumor marker    Nocturia  He wakes up 1-2 times at night, no significant change in fluid, screening prostate cancer with PSA test discussed and ordered.  - PSA, tumor marker; Future  - PSA, tumor  marker    Personal history of tobacco use  Negative lung cancer last year, due for low-dose CT to screen for lung cancer.  Risk and benefit discussed.  - Prof fee: Shared Decision Making for Lung Cancer Screening  - CT Chest Lung Cancer Scrn Low Dose wo; Future    Patient has been advised of split billing requirements and indicates understanding: Yes        Counseling  Appropriate preventive services were addressed with this patient via screening, questionnaire, or discussion as appropriate for fall prevention, nutrition, physical activity, Tobacco-use cessation, social engagement, weight loss and cognition.  Checklist reviewing preventive services available has been given to the patient.  Reviewed patient's diet, addressing concerns and/or questions.   The patient reports drinking more than 3 alcoholic drinks per day and/or more than 7 drhnks per week. The patient was counseled and given information about possible harmful effects of excessive alcohol intake.    Regular exercise    James Erwin is a 74 year old, presenting for the following:  Wellness Visit        3/3/2025     7:48 AM   Additional Questions   Roomed by Ayanna SMITH   Accompanied by wife           HPI  Memory lapses, mostly recent event, interested on seeing a specialist.  Former smoker, coronary calcification, small aneurysm, no symptoms related to that, is on statin therapy 40 mg daily, also taking losartan 50 mg daily for hypertension.  Advance Care Planning  Patient has a Health Care Directive on file  Discussed advance care planning with patient.      2/24/2025   General Health   How would you rate your overall physical health? Good   Feel stress (tense, anxious, or unable to sleep) Patient declined         2/24/2025   Nutrition   Diet: Regular (no restrictions)         2/20/2024   Exercise   Days per week of moderate/strenous exercise 7 days         2/24/2025   Social Factors   Frequency of gathering with friends or relatives Patient declined    Worry food won't last until get money to buy more No   Food not last or not have enough money for food? No   Do you have housing? (Housing is defined as stable permanent housing and does not include staying ouside in a car, in a tent, in an abandoned building, in an overnight shelter, or couch-surfing.) Yes   Are you worried about losing your housing? No   Lack of transportation? No   Unable to get utilities (heat,electricity)? No         2/24/2025   Fall Risk   Fallen 2 or more times in the past year? No   Trouble with walking or balance? No          2/24/2025   Activities of Daily Living- Home Safety   Needs help with the following daily activites None of the above   Safety concerns in the home None of the above         2/24/2025   Dental   Dentist two times every year? Yes         2/24/2025   Hearing Screening   Hearing concerns? None of the above         2/24/2025   Driving Risk Screening   Patient/family members have concerns about driving (!) DECLINE         2/24/2025   General Alertness/Fatigue Screening   Have you been more tired than usual lately? No         2/24/2025   Urinary Incontinence Screening   Bothered by leaking urine in past 6 months No            Today's PHQ-2 Score:       3/3/2025     7:38 AM   PHQ-2 ( 1999 [employer])   Q1: Little interest or pleasure in doing things 0   Q2: Feeling down, depressed or hopeless 0   PHQ-2 Score 0    Q1: Little interest or pleasure in doing things Not at all   Q2: Feeling down, depressed or hopeless Not at all   PHQ-2 Score 0       Patient-reported           2/24/2025   Substance Use   Alcohol more than 3/day or more than 7/wk Yes   How often do you have a drink containing alcohol 4 or more times a week   How many alcohol drinks on typical day 3 or 4   How often do you have 5+ drinks at one occasion Never   Audit 2/3 Score 1   How often not able to stop drinking once started Never   How often failed to do what normally expected Never   How often needed first drink  in am after a heavy drinking session Never   How often feeling of guilt or remorse after drinking Never   How often unable to remember what happened the night before Never   Have you or someone else been injured because of your drinking No   Has anyone been concerned or suggested you cut down on drinking No   TOTAL SCORE - AUDIT 5   Do you have a current opioid prescription? No   How severe/bad is pain from 1 to 10? 2/10   Do you use any other substances recreationally? (!) DECLINE     Social History     Tobacco Use    Smoking status: Former     Current packs/day: 1.00     Types: Cigarettes    Smokeless tobacco: Former     Quit date: 6/10/2016    Tobacco comments:     smoking since age 20 years old per pt  NO VAPING   Vaping Use    Vaping status: Never Used   Substance Use Topics    Alcohol use: Yes     Comment: Alcoholic Drinks/day: 3-5 beers per day    Drug use: No           2/24/2025   AAA Screening   Family history of Abdominal Aortic Aneurysm (AAA)? No   ASCVD Risk   The 10-year ASCVD risk score (Ralph WELLINGTON, et al., 2019) is: 19.6%    Values used to calculate the score:      Age: 74 years      Sex: Male      Is Non- : No      Diabetic: No      Tobacco smoker: No      Systolic Blood Pressure: 129 mmHg      Is BP treated: Yes      HDL Cholesterol: 73 mg/dL      Total Cholesterol: 172 mg/dL            Reviewed and updated as needed this visit by Provider                    Patient Active Problem List   Diagnosis    Adenomatous colon polyp    Nicotine dependence    Carpal tunnel syndrome of left wrist    Benign essential hypertension    Pulmonary emphysema, unspecified emphysema type (H)    Former smoker    Abdominal aortic aneurysm (AAA) 30 to 34 mm in diameter    Atherosclerosis of abdominal aorta    Erectile dysfunction, unspecified erectile dysfunction type     Past Surgical History:   Procedure Laterality Date    HERNIORRHAPHY INGUINAL Right 9/5/2023    Procedure: HERNIORRHAPHY,  INGUINAL, OPEN RIGHT;  Surgeon: Greg Delarosa DO;  Location: [facility] Main OR    ORTHOPEDIC SURGERY         Social History     Tobacco Use    Smoking status: Former     Current packs/day: 1.00     Types: Cigarettes    Smokeless tobacco: Former     Quit date: 6/10/2016    Tobacco comments:     smoking since age 20 years old per pt  NO VAPING   Substance Use Topics    Alcohol use: Yes     Comment: Alcoholic Drinks/day: 3-5 beers per day     Family History   Problem Relation Age of Onset    No Known Problems Mother     No Known Problems Father     No Known Problems Maternal Grandmother     No Known Problems Maternal Grandfather     No Known Problems Paternal Grandmother     No Known Problems Paternal Grandfather     No Known Problems Sister     No Known Problems Brother     No Known Problems Maternal Half-Brother     No Known Problems Maternal Half-Sister     No Known Problems Paternal Half-Brother     No Known Problems Paternal Half-Sister     No Known Problems Daughter     No Known Problems Son     No Known Problems Maternal Aunt     No Known Problems Maternal Uncle     No Known Problems Paternal Aunt     No Known Problems Paternal Uncle     No Known Problems Cousin     No Known Problems Other          Current providers sharing in care for this patient include:  Patient Care Team:  Lauren Rangel MD as PCP - General (Family Medicine)  Lauren Rangel MD as Assigned PCP  Rosalba Bean PA-C as Assigned Surgical Provider    The following health maintenance items are reviewed in Epic and correct as of today:  Health Maintenance   Topic Date Due    SPIROMETRY  Never done    COPD ACTION PLAN  Never done    BMP  02/20/2025    LIPID  02/20/2025    ANNUAL REVIEW OF HM ORDERS  02/20/2025    MEDICARE ANNUAL WELLNESS VISIT  02/20/2025    COVID-19 Vaccine (9 - 2024-25 season) 03/04/2025    LUNG CANCER SCREENING  06/17/2025    FALL RISK ASSESSMENT  03/03/2026    GLUCOSE  02/20/2027    DTAP/TDAP/TD IMMUNIZATION (3 -  "Td or Tdap) 10/11/2027    COLORECTAL CANCER SCREENING  07/18/2028    ADVANCE CARE PLANNING  02/21/2029    HEPATITIS C SCREENING  Completed    PHQ-2 (once per calendar year)  Completed    INFLUENZA VACCINE  Completed    Pneumococcal Vaccine: 50+ Years  Completed    ZOSTER IMMUNIZATION  Completed    RSV VACCINE  Completed    HPV IMMUNIZATION  Aged Out    MENINGITIS IMMUNIZATION  Aged Out         Review of Systems  Constitutional, neuro, ENT, endocrine, pulmonary, cardiac, gastrointestinal, genitourinary, musculoskeletal, integument and psychiatric systems are negative, except as otherwise noted.     Objective    Exam  /72 (BP Location: Right arm, Patient Position: Sitting, Cuff Size: Adult Regular)   Pulse 71   Temp 97.3  F (36.3  C) (Oral)   Resp 16   Ht 1.676 m (5' 6\")   Wt 66.2 kg (146 lb)   SpO2 98%   BMI 23.57 kg/m     Estimated body mass index is 23.57 kg/m  as calculated from the following:    Height as of this encounter: 1.676 m (5' 6\").    Weight as of this encounter: 66.2 kg (146 lb).    Physical Exam  GENERAL: alert and no distress  NECK: no adenopathy, no asymmetry, masses, or scars  RESP: lungs clear to auscultation - no rales, rhonchi or wheezes  CV: regular rate and rhythm, normal S1 S2, no S3 or S4, no murmur, click or rub, no peripheral edema  ABDOMEN: soft, nontender, no hepatosplenomegaly, no masses and bowel sounds normal  MS: no gross musculoskeletal defects noted, no edema  NEURO: Normal strength and tone, mentation intact and speech normal        3/3/2025   Mini Cog   Clock Draw Score 0 Abnormal   3 Item Recall 0 objects recalled   Mini Cog Total Score 0              Signed Electronically by: Lauren Rangel MD    "

## 2025-04-08 ENCOUNTER — MYC MEDICAL ADVICE (OUTPATIENT)
Dept: FAMILY MEDICINE | Facility: CLINIC | Age: 75
End: 2025-04-08
Payer: COMMERCIAL

## 2025-04-08 DIAGNOSIS — F41.9 ANXIETY: ICD-10-CM

## 2025-04-08 DIAGNOSIS — R41.3 MEMORY LOSS: Primary | ICD-10-CM

## 2025-04-10 RX ORDER — HYDROXYZINE HYDROCHLORIDE 25 MG/1
25 TABLET, FILM COATED ORAL EVERY 8 HOURS PRN
Qty: 30 TABLET | Refills: 0 | Status: SHIPPED | OUTPATIENT
Start: 2025-04-10 | End: 2025-05-10

## 2025-04-10 NOTE — TELEPHONE ENCOUNTER
RN attempted to contact [facility] regarding patient for sooner appointment, but no answer. Left message on [facility] voicemail to call clinic back.    Also contacted [facility] and appointment on 7/29/25 @ 1:00 At [place] Location  [address] [place] [address]

## 2025-04-10 NOTE — TELEPHONE ENCOUNTER
Message routed to Dr Rangel for possible anxiety medication    Sommer Ellis RN  [facility]    Hopefully you are successful at getting him an earlier appointment. Is there a possibility of an emergency anxiety med like xanax? Last week he was so agitated that my mom was considering bringing him to the hospital.

## 2025-04-10 NOTE — TELEPHONE ENCOUNTER
I think the priority is to come out with a diagnosis, especially if we are dealing with dementia,some of the medication use for anxiety and depression can mask  symptoms dementia.please  call [facility]ya or [facility] to see if they can see him sooner Or if they need anything from us to see him sooner.  Thank you

## 2025-04-10 NOTE — TELEPHONE ENCOUNTER
RECORDS RECEIVED FROM: Internal    REASON FOR VISIT: Memory loss   PROVIDER: Johnna Avilez MD   DATE OF APPT: 4/11/25 @ 2:15 pm    NOTES (FOR ALL VISITS) STATUS DETAILS   OFFICE NOTE from referring provider Internal 3/3/25 Lauren Rangel MD @[facility]     MEDICATION LIST Internal    IMAGING  (FOR ALL VISITS)     MRI (HEAD, NECK, SPINE) Internal [facility]  9/28/23 MRA Neck (Carotid)  9/28/23 MRA Brain (COW)  9/28/23 MR Brain

## 2025-04-10 NOTE — TELEPHONE ENCOUNTER
Dr. Rangel -   Patient family requesting higher priority neurology referral for appointment before August 2025.   Wondering about depression/anxiety medication for patient, do you recommend office visit to discuss?    Karla Muñoz RN  [facility]

## 2025-04-10 NOTE — TELEPHONE ENCOUNTER
"Specialty scheduling: Dr. Rangel updated patient's neurology referral from routine to \"priority\". Is patient able to be seen sooner than 7/29/25?    Per Juan Carlos: Please call [facility]ya or [facility] to see if they can see him sooner Or if they need anything from us to see him sooner.     Karla Muñoz RN  [facility]    "

## 2025-04-10 NOTE — TELEPHONE ENCOUNTER
Patient's daughter calling to check status of request. No CTC on file for patient's daughter, only spouse.  Daughter questioned CTC as she has proxy access to patient's [employer].  Writer will send update via [employer] response, this can be viewed by [employer] proxies.  Daughter verbalizes understanding and agreement with plan. Will have patient complete new CTC next time they are seen at [facility].    Karla Muñoz RN  [facility]

## 2025-04-11 ENCOUNTER — PRE VISIT (OUTPATIENT)
Dept: NEUROLOGY | Facility: CLINIC | Age: 75
End: 2025-04-11

## 2025-04-14 ENCOUNTER — NURSE TRIAGE (OUTPATIENT)
Dept: FAMILY MEDICINE | Facility: CLINIC | Age: 75
End: 2025-04-14

## 2025-04-14 ENCOUNTER — HOSPITAL ENCOUNTER (EMERGENCY)
Facility: CLINIC | Age: 75
Discharge: HOME | End: 2025-04-14
Attending: STUDENT IN AN ORGANIZED HEALTH CARE EDUCATION/TRAINING PROGRAM | Admitting: STUDENT IN AN ORGANIZED HEALTH CARE EDUCATION/TRAINING PROGRAM
Payer: COMMERCIAL

## 2025-04-14 VITALS
WEIGHT: 146 LBS | BODY MASS INDEX: 24.32 KG/M2 | OXYGEN SATURATION: 96 % | HEART RATE: 90 BPM | SYSTOLIC BLOOD PRESSURE: 135 MMHG | HEIGHT: 65 IN | TEMPERATURE: 97.8 F | RESPIRATION RATE: 23 BRPM | DIASTOLIC BLOOD PRESSURE: 78 MMHG

## 2025-04-14 DIAGNOSIS — T50.901A OVERDOSE, ACCIDENTAL OR UNINTENTIONAL, INITIAL ENCOUNTER: ICD-10-CM

## 2025-04-14 LAB
ALBUMIN SERPL BCG-MCNC: 4.6 G/DL (ref 3.5–5.2)
ALP SERPL-CCNC: 52 U/L (ref 40–150)
ALT SERPL W P-5'-P-CCNC: 24 U/L (ref 0–70)
ANION GAP SERPL CALCULATED.3IONS-SCNC: 13 MMOL/L (ref 7–15)
APAP SERPL-MCNC: <5 UG/ML (ref 10–30)
AST SERPL W P-5'-P-CCNC: 26 U/L (ref 0–45)
ATRIAL RATE - MUSE: 95 BPM
BASOPHILS # BLD AUTO: 0 10E3/UL (ref 0–0.2)
BASOPHILS NFR BLD AUTO: 0 %
BILIRUB DIRECT SERPL-MCNC: 0.53 MG/DL (ref 0–0.3)
BILIRUB SERPL-MCNC: 1.3 MG/DL
BUN SERPL-MCNC: 31.6 MG/DL (ref 8–23)
CALCIUM SERPL-MCNC: 9.9 MG/DL (ref 8.8–10.4)
CHLORIDE SERPL-SCNC: 101 MMOL/L (ref 98–107)
CK SERPL-CCNC: 110 U/L (ref 39–308)
CREAT SERPL-MCNC: 1.04 MG/DL (ref 0.67–1.17)
DIASTOLIC BLOOD PRESSURE - MUSE: 84 MMHG
EGFRCR SERPLBLD CKD-EPI 2021: 76 ML/MIN/1.73M2
EOSINOPHIL # BLD AUTO: 0 10E3/UL (ref 0–0.7)
EOSINOPHIL NFR BLD AUTO: 1 %
ERYTHROCYTE [DISTWIDTH] IN BLOOD BY AUTOMATED COUNT: 11.9 % (ref 10–15)
GLUCOSE SERPL-MCNC: 97 MG/DL (ref 70–99)
HCO3 SERPL-SCNC: 23 MMOL/L (ref 22–29)
HCT VFR BLD AUTO: 40.1 % (ref 40–53)
HGB BLD-MCNC: 13.7 G/DL (ref 13.3–17.7)
IMM GRANULOCYTES # BLD: 0 10E3/UL
IMM GRANULOCYTES NFR BLD: 0 %
INR PPP: 0.99 (ref 0.85–1.15)
INTERPRETATION ECG - MUSE: NORMAL
LYMPHOCYTES # BLD AUTO: 1.4 10E3/UL (ref 0.8–5.3)
LYMPHOCYTES NFR BLD AUTO: 23 %
MCH RBC QN AUTO: 31.6 PG (ref 26.5–33)
MCHC RBC AUTO-ENTMCNC: 34.2 G/DL (ref 31.5–36.5)
MCV RBC AUTO: 92 FL (ref 78–100)
MONOCYTES # BLD AUTO: 0.6 10E3/UL (ref 0–1.3)
MONOCYTES NFR BLD AUTO: 11 %
NEUTROPHILS # BLD AUTO: 3.9 10E3/UL (ref 1.6–8.3)
NEUTROPHILS NFR BLD AUTO: 65 %
NRBC # BLD AUTO: 0 10E3/UL
NRBC BLD AUTO-RTO: 0 /100
P AXIS - MUSE: 70 DEGREES
PLATELET # BLD AUTO: 265 10E3/UL (ref 150–450)
POTASSIUM SERPL-SCNC: 3.9 MMOL/L (ref 3.4–5.3)
PR INTERVAL - MUSE: 118 MS
PROT SERPL-MCNC: 6.8 G/DL (ref 6.4–8.3)
QRS DURATION - MUSE: 122 MS
QT - MUSE: 362 MS
QTC - MUSE: 454 MS
R AXIS - MUSE: 7 DEGREES
RBC # BLD AUTO: 4.34 10E6/UL (ref 4.4–5.9)
SALICYLATES SERPL-MCNC: <0.5 MG/DL
SODIUM SERPL-SCNC: 137 MMOL/L (ref 135–145)
SYSTOLIC BLOOD PRESSURE - MUSE: 143 MMHG
T AXIS - MUSE: 54 DEGREES
VENTRICULAR RATE- MUSE: 95 BPM
WBC # BLD AUTO: 5.9 10E3/UL (ref 4–11)

## 2025-04-14 PROCEDURE — 85041 AUTOMATED RBC COUNT: CPT | Performed by: STUDENT IN AN ORGANIZED HEALTH CARE EDUCATION/TRAINING PROGRAM

## 2025-04-14 PROCEDURE — 82550 ASSAY OF CK (CPK): CPT | Performed by: STUDENT IN AN ORGANIZED HEALTH CARE EDUCATION/TRAINING PROGRAM

## 2025-04-14 PROCEDURE — 99284 EMERGENCY DEPT VISIT MOD MDM: CPT | Performed by: STUDENT IN AN ORGANIZED HEALTH CARE EDUCATION/TRAINING PROGRAM

## 2025-04-14 PROCEDURE — 96360 HYDRATION IV INFUSION INIT: CPT | Performed by: STUDENT IN AN ORGANIZED HEALTH CARE EDUCATION/TRAINING PROGRAM

## 2025-04-14 PROCEDURE — 80143 DRUG ASSAY ACETAMINOPHEN: CPT | Performed by: STUDENT IN AN ORGANIZED HEALTH CARE EDUCATION/TRAINING PROGRAM

## 2025-04-14 PROCEDURE — 36415 COLL VENOUS BLD VENIPUNCTURE: CPT | Performed by: STUDENT IN AN ORGANIZED HEALTH CARE EDUCATION/TRAINING PROGRAM

## 2025-04-14 PROCEDURE — 80179 DRUG ASSAY SALICYLATE: CPT | Performed by: STUDENT IN AN ORGANIZED HEALTH CARE EDUCATION/TRAINING PROGRAM

## 2025-04-14 PROCEDURE — 85004 AUTOMATED DIFF WBC COUNT: CPT | Performed by: STUDENT IN AN ORGANIZED HEALTH CARE EDUCATION/TRAINING PROGRAM

## 2025-04-14 PROCEDURE — 258N000003 HC RX IP 258 OP 636: Performed by: STUDENT IN AN ORGANIZED HEALTH CARE EDUCATION/TRAINING PROGRAM

## 2025-04-14 PROCEDURE — 85610 PROTHROMBIN TIME: CPT | Performed by: STUDENT IN AN ORGANIZED HEALTH CARE EDUCATION/TRAINING PROGRAM

## 2025-04-14 PROCEDURE — 93005 ELECTROCARDIOGRAM TRACING: CPT | Performed by: STUDENT IN AN ORGANIZED HEALTH CARE EDUCATION/TRAINING PROGRAM

## 2025-04-14 PROCEDURE — 96361 HYDRATE IV INFUSION ADD-ON: CPT | Performed by: STUDENT IN AN ORGANIZED HEALTH CARE EDUCATION/TRAINING PROGRAM

## 2025-04-14 PROCEDURE — 82248 BILIRUBIN DIRECT: CPT | Performed by: STUDENT IN AN ORGANIZED HEALTH CARE EDUCATION/TRAINING PROGRAM

## 2025-04-14 RX ADMIN — SODIUM CHLORIDE 1000 ML: 9 INJECTION, SOLUTION INTRAVENOUS at 14:06

## 2025-04-14 ASSESSMENT — ACTIVITIES OF DAILY LIVING (ADL)
ADLS_ACUITY_SCORE: 41

## 2025-04-14 NOTE — TELEPHONE ENCOUNTER
Nurse Triage SBAR    Is this a 2nd Level Triage? YES, LICENSED PRACTITIONER REVIEW IS REQUIRED    Situation: Patient's wife calling to report patient took about 4-5 weeks worth of his Losartan and atorvastatin    Background: Wife reports he thinks he still has to meds constantly, seems to be confused when reminded he already took them    Assessment: Patient is not having any symptoms at this time. Wife said he had a mediset that he refilled a few times today and took approximately 4-5 weeks worth of meds. Was instructed to call Poison Control immediately. Phone number given. Also did let patient's wife know that if he has any symptoms, he needs to go to ED. She verbalized understanding.    Protocol Recommended Disposition:   Call Poison Center Now    Recommendation: Please make any recommendations.     Routed to provider    Does the patient meet one of the following criteria for ADS visit consideration? No    Raina Thibodeaux RN on 4/14/2025 at 11:23 AM      Reason for Disposition   MORE THAN A DOUBLE DOSE of a prescription or over-the-counter (OTC) drug    Additional Information   Negative: Intentional drug overdose and suicidal thoughts or ideas   Negative: Drug overdose and triager unable to answer question   Negative: Caller requesting a renewal or refill of a medicine patient is currently taking   Negative: Caller requesting information unrelated to medicine   Negative: Caller requesting information about COVID-19 Vaccine   Negative: Caller requesting information about Emergency Contraception   Negative: Caller requesting information about Combined Birth Control Pills   Negative: Caller requesting information about Progestin Birth Control Pills   Negative: Caller requesting information about post-op pain or medicines   Negative: Caller requesting a prescription antibiotic (such as penicillin) for Strep throat and has a positive culture result   Negative: Caller requesting a prescription anti-viral med (such as  Tamiflu) and has influenza (flu) symptoms   Negative: Immunization reaction suspected   Negative: Rash while taking a medicine or within 3 days of stopping it   Negative: Asthma and having symptoms of asthma (cough, wheezing, etc.)   Negative: Symptom of illness (e.g., headache, abdominal pain, earache, vomiting) that are more than mild   Negative: Breastfeeding questions about mother's medicines and diet    Protocols used: Medication Question Call-A-[place]

## 2025-04-14 NOTE — DISCHARGE INSTRUCTIONS
Take your previously prescribed medications as directed and follow-up closely with your primary care doctor.  Return to the ER for any worsening symptoms or other concerns.

## 2025-04-14 NOTE — PROGRESS NOTES
Pt placed call light stating he needed to urinate. Provided urinal and was able to urinate, but stated he needed to have BM also. Pt was given bed pan and was not able to have a BM.

## 2025-04-14 NOTE — ED TRIAGE NOTES
"PT presents to the ED with his wife. His wife states the PT takes 50mg of Losartan daily and 40mg of Atorvastatin daily. Pt states the PT loads his Sunday-Monday pill case with these meds and then proceeds to take all 7 days worth of tablets in one sitting.     Wife states yesterday he took one weeks worth of these meds at 0600.     She states today he is filling the Sunday-Monday case with pills, taking all 7 days, filling it again and taking all seven days again. She states he has done this at least 5 times today.     PT was diagnosed with alzheimer's last week. PT is confused in triage and continues to state \"I am here to get a refill.\".     Triage Assessment (Adult)       Row Name 04/14/25 1218          Triage Assessment    Airway WDL WDL        Respiratory WDL    Respiratory WDL WDL        Skin Circulation/Temperature WDL    Skin Circulation/Temperature WDL WDL        Cardiac WDL    Cardiac WDL WDL        Peripheral/Neurovascular WDL    Peripheral Neurovascular WDL WDL        Cognitive/Neuro/Behavioral WDL    Cognitive/Neuro/Behavioral WDL X;mood/behavior;orientation     Orientation disoriented to;time;situation        [place] Coma Scale    Best Eye Response 4-->(E4) spontaneous     Best Motor Response 6-->(M6) obeys commands     Best Verbal Response 4-->(V4) confused     [place] Coma Scale Score 14                     "

## 2025-04-14 NOTE — PROGRESS NOTES
Pt discharging via POV with spouse. All belongings with patient. Discharge instructions provided and questions encouraged. Denies pain. IV discontinued. VSS during observation for accidental ingestion. Spouse educated on safe medication storage. VSS on discharge.

## 2025-04-14 NOTE — ED PROVIDER NOTES
EMERGENCY DEPARTMENT ENCOUNTER      NAME: Rip Crockett  AGE: 74 year old male  YOB: 1950  MRN: [id number]  EVALUATION DATE & TIME: 4/14/2025 12:26 PM    PCP: Lauren Rangel    ED PROVIDER: Tai Chaudhry MD      Chief Complaint   Patient presents with    Altered Mental Status    Ingestion         FINAL IMPRESSION:  1. Overdose, accidental or unintentional, initial encounter          ED COURSE & MEDICAL DECISION MAKING:    Pertinent Labs & Imaging studies reviewed. (See chart for details)  74 year old male presents to the Emergency Department for evaluation of accidental overdose    ED Course as of 04/14/25 1609   Mon Apr 14, 2025   1251 Patient is a 74-year-old male with  recently diagnosed Alzheimer's dementia, hypertension, hyperlipidemia who presents to the emergency department after accidental overdose of home prescription medications.  He is companied by wife helps offer collateral information.  Reportedly patient had been taking all of the pills in his weekly pill planner at a time and then refilling them on his own and then taking the medications once again.  Unclear how many pills he may have taken.   wife states he last took any medications a couple hours before arrival here in the emergency department.  Has not had any vomiting.  Denies any abdominal pain.  Patient himself denies any specific complaints at this point in time.    Exam patient is awake.  Knows he is in the hospital.  Not certain the year but knows the president.  Moist mucous membranes, no hyperreflexia or clonus, no abdominal tenderness.  Warm well-perfused extremities and normotensive.    I will obtain EKG as well as blood work to include Tylenol and salicylates.   1347 Discussed with poison control.  Fortunately no significant hyperkalemia.  This point in time we will continue to monitor for development of hypotension will plan to observe for at least 4 hours around 6 PM   1349 Labs reviewed interpreted myself.  BMP shows  slight elevation BUN to creatinine ratio but normal potassium and other electrolytes.  No significant metabolic derangements.  CPK within normal limits.  Undetectable salicylates and acetaminophen   1403 EKG shows a sinus rhythm at 95 beats a minute, normal axis, normal CT,  ms with right bundle branch morphology similar compared to prior, normal QTc, no ST elevations or acute ischemic T wave changes.       Addendum x-ray of close remains hemodynamically stable without any hypotension.  After discussion with poison control plan to observe him until approximately 6 PM.  If he remains normotensive otherwise well at that point I think he should be safe for discharge home.  I do not believe this was an attempt at self-harm and patient denies any suicidal ideation.      If he develops hypotension will require repeat BMP and further discussion with poison control as this could be related to accidental overdose of his losartan.  Patient signed out to a colleague of [facility] at the end of my shift to update documentation and treatment plan as needed.      12:55 PM I met and evaluated the patient.         Medical Decision Making  I obtained history from Family Member/Significant Other  Admission considered. Patient was signed out to the oncoming physician, disposition pending.    MIPS (CTPE, Dental pain, [place], Sinusitis, Asthma/COPD, Head Trauma): Not Applicable    SEPSIS: None           At the conclusion of the encounter I discussed the results of all of the tests and the disposition. The questions were answered. The patient or family acknowledged understanding and was agreeable with the care plan.     0 minutes of critical care time     MEDICATIONS GIVEN IN THE EMERGENCY:  Medications   sodium chloride 0.9% BOLUS 1,000 mL (1,000 mLs Intravenous $New Bag 4/14/25 8638)       NEW PRESCRIPTIONS STARTED AT TODAY'S ER VISIT  New Prescriptions    No medications on file       "    =================================================================    HPI    Patient information was obtained from: Wife, Patient    Use of : N/A       Rip Crockett is a 74 year old male with a pertinent history of HTN, pulmonary emphysema, and Alzheimer's dementia who presents to this ED by private car with his wife for evaluation after the patient ingested weeks worth of his prescribed medications.    Per the patient's wife, the patient was diagnosed with Alzheimer's on 4/11/25. She reports he had some forgetfulness recently, but after she was admitted to the hospital a couple weeks ago the patient has had increased confusion and memory difficulties. She is bringing the patient in to the ED today after the patient took several weeks of his prescribed losartan and atorvastatin over the past 30 hours.    She reports the patient has a pill box at home. Normally he refills his pill box on Sundays. He typically takes his medications in the evening. Yesterday he decided to take his pills in the morning. He refilled his pill box but then took all 7 days of the medications. Over the past 30 hours the patient has done this multiple times. The patient's wife estimates the patient may have done this as many as 6 times and in total may have taken 42 days worth of the medications.    The patient's wife brought the pill bottles with them. The medications were last refilled on 3/3/25. At that time the patient received a 90 day supple of the medications, losartan 50 mg and atorvastatin 40 mg. Here in the ED the patient has 16 losartan left and 18 atorvastatin left.    The patient is no on any over the counter medications. He was prescribed hydroxyzine for anxiety to take as needed. The patient's wife gave him one dose yesterday and a second dose this morning.    The patient states he is \"feeling good.\" He is not having any nausea, vomiting, abdominal pain, lightheadedness, or dizziness.      REVIEW OF SYSTEMS "   Refer to the HPI    PAST MEDICAL HISTORY:  Past Medical History:   Diagnosis Date    Benign essential hypertension 08/16/2021    Sprain and strain of unspecified site of hip and thigh     Created by Conversion        PAST SURGICAL HISTORY:  Past Surgical History:   Procedure Laterality Date    HERNIORRHAPHY INGUINAL Right 9/5/2023    Procedure: HERNIORRHAPHY, INGUINAL, OPEN RIGHT;  Surgeon: Greg Delarosa DO;  Location: [facility] Main OR    ORTHOPEDIC SURGERY             CURRENT MEDICATIONS:    atorvastatin (LIPITOR) 40 MG tablet  hydrOXYzine HCl (ATARAX) 25 MG tablet  ibuprofen (ADVIL,MOTRIN) 200 MG tablet  losartan (COZAAR) 50 MG tablet  sildenafil (REVATIO) 20 MG tablet        ALLERGIES:  Allergies   Allergen Reactions    Penicillins     Cephalexin Hives and Rash    Cephalosporins Rash       FAMILY HISTORY:  Family History   Problem Relation Age of Onset    No Known Problems Mother     No Known Problems Father     No Known Problems Maternal Grandmother     No Known Problems Maternal Grandfather     No Known Problems Paternal Grandmother     No Known Problems Paternal Grandfather     No Known Problems Sister     No Known Problems Brother     No Known Problems Maternal Half-Brother     No Known Problems Maternal Half-Sister     No Known Problems Paternal Half-Brother     No Known Problems Paternal Half-Sister     No Known Problems Daughter     No Known Problems Son     No Known Problems Maternal Aunt     No Known Problems Maternal Uncle     No Known Problems Paternal Aunt     No Known Problems Paternal Uncle     No Known Problems Cousin     No Known Problems Other        SOCIAL HISTORY:   Social History     Socioeconomic History    Marital status:    Tobacco Use    Smoking status: Former     Current packs/day: 1.00     Types: Cigarettes    Smokeless tobacco: Former     Quit date: 6/10/2016    Tobacco comments:     smoking since age 20 years old per pt  NO VAPING   Vaping Use    Vaping status: Never  "Used   Substance and Sexual Activity    Alcohol use: Yes     Comment: Alcoholic Drinks/day: 3-5 beers per day    Drug use: No   Other Topics Concern    Parent/sibling w/ CABG, MI or angioplasty before 65F 55M? No     Social Drivers of Health     Financial Resource Strain: Low Risk  (2/24/2025)    Financial Resource Strain     Within the past 12 months, have you or your family members you live with been unable to get utilities (heat, electricity) when it was really needed?: No   Food Insecurity: Low Risk  (2/24/2025)    Food Insecurity     Within the past 12 months, did you worry that your food would run out before you got money to buy more?: No     Within the past 12 months, did the food you bought just not last and you didn t have money to get more?: No   Transportation Needs: Low Risk  (2/24/2025)    Transportation Needs     Within the past 12 months, has lack of transportation kept you from medical appointments, getting your medicines, non-medical meetings or appointments, work, or from getting things that you need?: No   Physical Activity: Unknown (2/20/2024)    Exercise Vital Sign     Days of Exercise per Week: 7 days   Stress: Patient Declined (2/24/2025)    [employer] [employer] - Occupational Stress Questionnaire     Feeling of Stress : Patient declined   Social Connections: Unknown (2/24/2025)    Social Connection and Isolation Panel [NHANES]     Frequency of Social Gatherings with Friends and Family: Patient declined   Housing Stability: Low Risk  (2/24/2025)    Housing Stability     Do you have housing? : Yes     Are you worried about losing your housing?: No       VITALS:  /71   Pulse 76   Temp 97.8  F (36.6  C)   Resp 16   Ht 1.651 m (5' 5\")   Wt 66.2 kg (146 lb)   SpO2 98%   BMI 24.30 kg/m      PHYSICAL EXAM    Constitutional: Well developed, Well nourished, NAD  HENT: Normocephalic, Atraumatic,  mucous membranes moist,   Neck- trachea midline, No stridor.   Eyes:EOMI, " Conjunctiva normal, No discharge.  Respiratory: Normal breath sounds, No respiratory distress, No wheezing.   Cardiovascular: Normal heart rate, Regular rhythm, No murmurs   Abdominal: Soft, No tenderness, No rebound or guarding.    Musculoskeletal: no deformity or malalignment  Integument: Warm, Dry, No erythema  Neurologic: Alert & oriented to person and place. No hyperreflexia or clonus.  Psychiatric: Affect normal, Cooperative.     LAB:  All pertinent labs reviewed and interpreted.  Results for orders placed or performed during the hospital encounter of 04/14/25   Basic metabolic panel   Result Value Ref Range    Sodium 137 135 - 145 mmol/L    Potassium 3.9 3.4 - 5.3 mmol/L    Chloride 101 98 - 107 mmol/L    Carbon Dioxide (CO2) 23 22 - 29 mmol/L    Anion Gap 13 7 - 15 mmol/L    Urea Nitrogen 31.6 (H) 8.0 - 23.0 mg/dL    Creatinine 1.04 0.67 - 1.17 mg/dL    GFR Estimate 76 >60 mL/min/1.73m2    Calcium 9.9 8.8 - 10.4 mg/dL    Glucose 97 70 - 99 mg/dL   Hepatic function panel   Result Value Ref Range    Protein Total 6.8 6.4 - 8.3 g/dL    Albumin 4.6 3.5 - 5.2 g/dL    Bilirubin Total 1.3 (H) <=1.2 mg/dL    Alkaline Phosphatase 52 40 - 150 U/L    AST 26 0 - 45 U/L    ALT 24 0 - 70 U/L    Bilirubin Direct 0.53 (H) 0.00 - 0.30 mg/dL   Acetaminophen level   Result Value Ref Range    Acetaminophen <5.0 (L) 10.0 - 30.0 ug/mL   Salicylate level   Result Value Ref Range    Salicylate <0.5   mg/dL   Result Value Ref Range     39 - 308 U/L   Result Value Ref Range    INR 0.99 0.85 - 1.15   CBC with platelets and differential   Result Value Ref Range    WBC Count 5.9 4.0 - 11.0 10e3/uL    RBC Count 4.34 (L) 4.40 - 5.90 10e6/uL    Hemoglobin 13.7 13.3 - 17.7 g/dL    Hematocrit 40.1 40.0 - 53.0 %    MCV 92 78 - 100 fL    MCH 31.6 26.5 - 33.0 pg    MCHC 34.2 31.5 - 36.5 g/dL    RDW 11.9 10.0 - 15.0 %    Platelet Count 265 150 - 450 10e3/uL    % Neutrophils 65 %    % Lymphocytes 23 %    % Monocytes 11 %    % Eosinophils 1  %    % Basophils 0 %    % Immature Granulocytes 0 %    NRBCs per 100 WBC 0 <1 /100    Absolute Neutrophils 3.9 1.6 - 8.3 10e3/uL    Absolute Lymphocytes 1.4 0.8 - 5.3 10e3/uL    Absolute Monocytes 0.6 0.0 - 1.3 10e3/uL    Absolute Eosinophils 0.0 0.0 - 0.7 10e3/uL    Absolute Basophils 0.0 0.0 - 0.2 10e3/uL    Absolute Immature Granulocytes 0.0 <=0.4 10e3/uL    Absolute NRBCs 0.0 10e3/uL   ECG 12-LEAD WITH MUSE (LHE)   Result Value Ref Range    Systolic Blood Pressure 143 mmHg    Diastolic Blood Pressure 84 mmHg    Ventricular Rate 95 BPM    Atrial Rate 95 BPM    NE Interval 118 ms    QRS Duration 122 ms     ms    QTc 454 ms    P Axis 70 degrees    R AXIS 7 degrees    T Axis 54 degrees    Interpretation ECG       ** Poor data quality, interpretation may be adversely affected  Sinus rhythm  Right bundle branch block  Abnormal ECG  When compared with ECG of 28-Sep-2023 08:49,  Vent. rate has increased by  35 bpm  Non-specific change in ST segment in Anterior leads  T wave inversion now evident in Anterior leads  Confirmed by SEE ED PROVIDER NOTE FOR, ECG INTERPRETATION ([id number]),  CODY AVILA ([id number]) on 4/14/2025 12:59:25 PM         RADIOLOGY:  Reviewed all pertinent imaging. Please see official radiology report.  No orders to display       EKG:    Performed at:     Impression: EKG shows a sinus rhythm at 95 beats a minute, normal axis, normal NE,  ms with right bundle branch morphology similar compared to prior, normal QTc, no ST elevations or acute ischemic T wave changes.        I have independently reviewed and interpreted the EKG(s) documented above.    PROCEDURES:         [facility] Documentation:   CMS Diagnoses:              IJoseph, am serving as a scribe to document services personally performed by Tai Chaudhry MD based on my observation and the provider's statements to me. I, Tai Chaudhry MD, attest that Joseph Luis is acting in a scribe capacity, has  observed my performance of the services and has documented them in accordance with my direction.    Tai Chaudhry MD  [facility] EMERGENCY ROOM  [facility] [id number] [phone]     Tai Chaudhry MD  04/14/25 4627

## 2025-04-14 NOTE — TELEPHONE ENCOUNTER
That is a very chong  amount, I am concerned about low blood pressure especially with that amount of losartan, I recommend he go to the ED for additional management.

## 2025-04-14 NOTE — ED NOTES
EMERGENCY DEPARTMENT SIGN OUT NOTE        ED COURSE AND MEDICAL DECISION MAKING  Patient was signed out to me by Dr Tai Chaudhry at 3:54 PM.    In brief, Rip Crockett is a 74 year old male who initially presented for evaluation of altered mental status and ingestion of losartan and atorvastatin. Patient was recently diagnosed with Alzheimer's disease and per his wife, has taken the entire week's worth of his medications a minimum of 5 times today.     At time of sign out, disposition was pending observation.     FINAL IMPRESSION    1. Overdose, accidental or unintentional, initial encounter        ED MEDS  Medications   sodium chloride 0.9% BOLUS 1,000 mL (1,000 mLs Intravenous $New Bag 4/14/25 8222)       LAB  Labs Ordered and Resulted from Time of ED Arrival to Time of ED Departure   BASIC METABOLIC PANEL - Abnormal       Result Value    Sodium 137      Potassium 3.9      Chloride 101      Carbon Dioxide (CO2) 23      Anion Gap 13      Urea Nitrogen 31.6 (*)     Creatinine 1.04      GFR Estimate 76      Calcium 9.9      Glucose 97     HEPATIC FUNCTION PANEL - Abnormal    Protein Total 6.8      Albumin 4.6      Bilirubin Total 1.3 (*)     Alkaline Phosphatase 52      AST 26      ALT 24      Bilirubin Direct 0.53 (*)    ACETAMINOPHEN LEVEL - Abnormal    Acetaminophen <5.0 (*)    CBC WITH PLATELETS AND DIFFERENTIAL - Abnormal    WBC Count 5.9      RBC Count 4.34 (*)     Hemoglobin 13.7      Hematocrit 40.1      MCV 92      MCH 31.6      MCHC 34.2      RDW 11.9      Platelet Count 265      % Neutrophils 65      % Lymphocytes 23      % Monocytes 11      % Eosinophils 1      % Basophils 0      % Immature Granulocytes 0      NRBCs per 100 WBC 0      Absolute Neutrophils 3.9      Absolute Lymphocytes 1.4      Absolute Monocytes 0.6      Absolute Eosinophils 0.0      Absolute Basophils 0.0      Absolute Immature Granulocytes 0.0      Absolute NRBCs 0.0     SALICYLATE LEVEL - Normal    Salicylate <0.5     CK TOTAL - Normal          INR - Normal    INR 0.99         EKG  ***    RADIOLOGY    No orders to display       DISCHARGE MEDS  New Prescriptions    No medications on file       MARÍA Soliman [facility] EMERGENCY ROOM  [address] [place] [id number] [phone]

## 2025-04-14 NOTE — TELEPHONE ENCOUNTER
1st: Left voicemail requesting patient's spouse return call to clinic. Please relay Dr. Rangel's recommendation upon return call.    Karla Muñoz RN  [facility]

## 2025-04-15 ENCOUNTER — LAB (OUTPATIENT)
Dept: LAB | Facility: CLINIC | Age: 75
End: 2025-04-15
Payer: COMMERCIAL

## 2025-04-15 DIAGNOSIS — R41.3 MEMORY LOSS: ICD-10-CM

## 2025-04-15 DIAGNOSIS — G93.40 ENCEPHALOPATHY, UNSPECIFIED TYPE: ICD-10-CM

## 2025-04-15 LAB
BASOPHILS # BLD AUTO: 0 10E3/UL (ref 0–0.2)
BASOPHILS NFR BLD AUTO: 0 %
EOSINOPHIL # BLD AUTO: 0 10E3/UL (ref 0–0.7)
EOSINOPHIL NFR BLD AUTO: 1 %
ERYTHROCYTE [DISTWIDTH] IN BLOOD BY AUTOMATED COUNT: 12 % (ref 10–15)
FOLATE SERPL-MCNC: 10.5 NG/ML (ref 4.6–34.8)
HCT VFR BLD AUTO: 39.5 % (ref 40–53)
HGB BLD-MCNC: 13 G/DL (ref 13.3–17.7)
IMM GRANULOCYTES # BLD: 0 10E3/UL
IMM GRANULOCYTES NFR BLD: 0 %
LYMPHOCYTES # BLD AUTO: 1.5 10E3/UL (ref 0.8–5.3)
LYMPHOCYTES NFR BLD AUTO: 21 %
MCH RBC QN AUTO: 31.7 PG (ref 26.5–33)
MCHC RBC AUTO-ENTMCNC: 32.9 G/DL (ref 31.5–36.5)
MCV RBC AUTO: 96 FL (ref 78–100)
MONOCYTES # BLD AUTO: 0.8 10E3/UL (ref 0–1.3)
MONOCYTES NFR BLD AUTO: 11 %
NEUTROPHILS # BLD AUTO: 4.7 10E3/UL (ref 1.6–8.3)
NEUTROPHILS NFR BLD AUTO: 67 %
PLATELET # BLD AUTO: 266 10E3/UL (ref 150–450)
RBC # BLD AUTO: 4.1 10E6/UL (ref 4.4–5.9)
TSH SERPL DL<=0.005 MIU/L-ACNC: 1.29 UIU/ML (ref 0.3–4.2)
VIT B12 SERPL-MCNC: <150 PG/ML (ref 232–1245)
WBC # BLD AUTO: 7.1 10E3/UL (ref 4–11)

## 2025-04-15 PROCEDURE — 36415 COLL VENOUS BLD VENIPUNCTURE: CPT

## 2025-04-15 PROCEDURE — 82746 ASSAY OF FOLIC ACID SERUM: CPT

## 2025-04-15 PROCEDURE — 82607 VITAMIN B-12: CPT

## 2025-04-15 PROCEDURE — 84443 ASSAY THYROID STIM HORMONE: CPT

## 2025-04-15 PROCEDURE — 85025 COMPLETE CBC W/AUTO DIFF WBC: CPT

## 2025-04-15 NOTE — TELEPHONE ENCOUNTER
Per chart review, patient was evaluated in ED yesterday and was discharged to home in stable condition.

## 2025-04-15 NOTE — TELEPHONE ENCOUNTER
Spouse return call. Pt was seen in ED for unintended overdose of medication secondary to confusion (recently dx with Alzheimer's dementia. Pt is stable. Spouse decline supportive resources at this time. No further action needed.     Ashley PEOPLES RN  [facility]

## 2025-04-17 ENCOUNTER — TELEPHONE (OUTPATIENT)
Dept: NEUROLOGY | Facility: CLINIC | Age: 75
End: 2025-04-17
Payer: COMMERCIAL

## 2025-04-17 NOTE — TELEPHONE ENCOUNTER
Patients spouse, Renetta (C2C on file) confirmed scheduled appointment:  Date: 12/12/25  Time: 1:15pm  Visit type: Return Neurology  Provider: Raghav  Location: [facility]  Testing/imaging: NA  Additional notes: 6 month follow up    Becki Hogue on 4/17/2025 at 4:18 PM

## 2025-04-22 ENCOUNTER — HOSPITAL ENCOUNTER (OUTPATIENT)
Dept: MRI IMAGING | Facility: CLINIC | Age: 75
Discharge: HOME | End: 2025-04-22
Attending: PSYCHIATRY & NEUROLOGY | Admitting: PSYCHIATRY & NEUROLOGY
Payer: COMMERCIAL

## 2025-04-22 DIAGNOSIS — G93.40 ENCEPHALOPATHY, UNSPECIFIED TYPE: ICD-10-CM

## 2025-04-22 DIAGNOSIS — R41.3 MEMORY LOSS: ICD-10-CM

## 2025-04-22 PROCEDURE — 70551 MRI BRAIN STEM W/O DYE: CPT

## 2025-04-24 NOTE — RESULT ENCOUNTER NOTE
Dear Rip,    I had time to review your MRI brain. I see no evidence of stroke or other newer injury to explain you recent memory changes. The findings do show some atrophy which is a medical term meaning shrinking of the brain. In addition we some some changes associated with hardening of the arteries (vascular changes) While we all get changes like this as we age the atrophy on your scan is slightly worse than would be expected and is most consistent with Alzheimer's disease. I believe we are now waiting for the EEG results.     Thank you,   Johnna Avilez

## 2025-05-27 ENCOUNTER — ANCILLARY PROCEDURE (OUTPATIENT)
Dept: NEUROLOGY | Facility: CLINIC | Age: 75
End: 2025-05-27
Attending: PSYCHIATRY & NEUROLOGY
Payer: COMMERCIAL

## 2025-05-27 DIAGNOSIS — R41.3 MEMORY LOSS: ICD-10-CM

## 2025-05-27 DIAGNOSIS — G93.40 ENCEPHALOPATHY, UNSPECIFIED TYPE: ICD-10-CM

## 2025-05-27 PROCEDURE — 95816 EEG AWAKE AND DROWSY: CPT | Performed by: PSYCHIATRY & NEUROLOGY

## 2025-05-30 ENCOUNTER — RESULTS FOLLOW-UP (OUTPATIENT)
Dept: NEUROLOGY | Facility: CLINIC | Age: 75
End: 2025-05-30

## 2025-06-11 DIAGNOSIS — F41.9 ANXIETY: ICD-10-CM

## 2025-06-12 RX ORDER — HYDROXYZINE HYDROCHLORIDE 25 MG/1
TABLET, FILM COATED ORAL
Qty: 30 TABLET | Refills: 0 | Status: SHIPPED | OUTPATIENT
Start: 2025-06-12

## 2025-06-12 NOTE — TELEPHONE ENCOUNTER
Med was ordered two months ago by PCP. Routing to clinician for review.    WINSTON PalmaN, PHN, AMB-BC (she/her)  [facility] RN

## 2025-06-13 ENCOUNTER — MYC MEDICAL ADVICE (OUTPATIENT)
Dept: NEUROLOGY | Facility: CLINIC | Age: 75
End: 2025-06-13
Payer: COMMERCIAL

## 2025-06-13 DIAGNOSIS — G30.9 ALZHEIMER'S DISEASE (H): Primary | ICD-10-CM

## 2025-06-13 DIAGNOSIS — F02.80 ALZHEIMER'S DISEASE (H): Primary | ICD-10-CM

## 2025-06-16 DIAGNOSIS — G30.9 ALZHEIMER'S DISEASE (H): ICD-10-CM

## 2025-06-16 DIAGNOSIS — F02.80 ALZHEIMER'S DISEASE (H): ICD-10-CM

## 2025-06-16 RX ORDER — DONEPEZIL HYDROCHLORIDE 10 MG/1
TABLET, FILM COATED ORAL
Qty: 75 TABLET | Refills: 0 | Status: SHIPPED | OUTPATIENT
Start: 2025-06-16 | End: 2025-09-14

## 2025-06-16 RX ORDER — MEMANTINE HYDROCHLORIDE 5 MG/1
10 TABLET ORAL 2 TIMES DAILY
Qty: 120 TABLET | Refills: 11 | Status: SHIPPED | OUTPATIENT
Start: 2025-06-16

## 2025-06-16 NOTE — TELEPHONE ENCOUNTER
[facility] Task Note    TASK    Rx Authorization:  Requested Medication/ Dose memantine (NAMENDA) 5 MG tablet   Date last refill ordered: 4/25/25  Quantity ordered: 120 tablets  # refills: 0  Date of last clinic visit with ordering provider: 4/11/25  Date of next clinic visit with ordering provider: 12/12/25  All pertinent protocol data (lab date/result):   Include pertinent information from patients message:         ADDITIONAL COMMENTS      Galina Rivera CMA

## 2025-06-18 ENCOUNTER — RESULTS FOLLOW-UP (OUTPATIENT)
Dept: FAMILY MEDICINE | Facility: CLINIC | Age: 75
End: 2025-06-18

## 2025-06-18 ENCOUNTER — HOSPITAL ENCOUNTER (OUTPATIENT)
Dept: ULTRASOUND IMAGING | Facility: HOSPITAL | Age: 75
Discharge: HOME | End: 2025-06-18
Attending: FAMILY MEDICINE
Payer: COMMERCIAL

## 2025-06-18 ENCOUNTER — HOSPITAL ENCOUNTER (OUTPATIENT)
Dept: CT IMAGING | Facility: HOSPITAL | Age: 75
Discharge: HOME | End: 2025-06-18
Attending: FAMILY MEDICINE
Payer: COMMERCIAL

## 2025-06-18 DIAGNOSIS — I25.10 CORONARY ARTERY CALCIFICATION SEEN ON CAT SCAN: ICD-10-CM

## 2025-06-18 DIAGNOSIS — I70.0 ATHEROSCLEROSIS OF ABDOMINAL AORTA: ICD-10-CM

## 2025-06-18 DIAGNOSIS — Z87.891 PERSONAL HISTORY OF TOBACCO USE: ICD-10-CM

## 2025-06-18 PROCEDURE — 71271 CT THORAX LUNG CANCER SCR C-: CPT

## 2025-06-18 PROCEDURE — 76706 US ABDL AORTA SCREEN AAA: CPT

## 2025-06-26 DIAGNOSIS — F41.9 ANXIETY: ICD-10-CM

## 2025-06-26 RX ORDER — HYDROXYZINE HYDROCHLORIDE 25 MG/1
TABLET, FILM COATED ORAL
Qty: 30 TABLET | Refills: 0 | OUTPATIENT
Start: 2025-06-26

## 2025-07-03 DIAGNOSIS — F41.9 ANXIETY: ICD-10-CM

## 2025-07-03 RX ORDER — HYDROXYZINE HYDROCHLORIDE 25 MG/1
TABLET, FILM COATED ORAL
Qty: 30 TABLET | Refills: 0 | Status: SHIPPED | OUTPATIENT
Start: 2025-07-03

## 2025-07-11 ENCOUNTER — TELEPHONE (OUTPATIENT)
Dept: FAMILY MEDICINE | Facility: CLINIC | Age: 75
End: 2025-07-11
Payer: COMMERCIAL

## 2025-07-11 DIAGNOSIS — J43.9 PULMONARY EMPHYSEMA, UNSPECIFIED EMPHYSEMA TYPE (H): ICD-10-CM

## 2025-07-11 DIAGNOSIS — R41.3 MEMORY LOSS: Primary | ICD-10-CM

## 2025-07-11 NOTE — TELEPHONE ENCOUNTER
Order/Referral Request    Who is requesting: Amber    Orders being requested: Home health care unskilled     Reason service is needed/diagnosis: wife is in the hospital and she is  primary care giver    When are orders needed by: ASAP    Has this been discussed with Provider: No    Does patient have a preference on a Group/Provider/Facility? N/A    Does patient have an appointment scheduled?: No    Where to send orders: Place orders within Epic    Could we send this information to you in [facility] or would you prefer to receive a phone call?:   Patient would prefer a phone call   Okay to leave a detailed message?: Yes at Cell number on file:    Telephone Information:   Mobile [phone]

## 2025-07-15 ENCOUNTER — PATIENT OUTREACH (OUTPATIENT)
Dept: CARE COORDINATION | Facility: CLINIC | Age: 75
End: 2025-07-15
Payer: COMMERCIAL

## 2025-07-15 NOTE — TELEPHONE ENCOUNTER
Dr. Rangel,    Please review. Pended.  Would you like pt to schedule an appt to discuss referral?    Marv SARABIA RN  [facility]

## 2025-07-15 NOTE — Clinical Note
FYI - all good for now, did encourage spouse to consider options for support if needed in future  Thanks KB

## 2025-07-15 NOTE — PROGRESS NOTES
Clinic Care Coordination Contact  Clinic Care Coordination Contact  OUTREACH    Referral Information:     CC team received message from care team to support home care placement    RN CC spoke with spouse - request came from family for help in home while pt spouse was in the hospital  Spouse has been discharged and she is expecting that she will be cleared to drive at the end of the week and states that home care is not needed      RN CC discussed resources for respite care and care giver support options to consider for pt if this may ever happen in future   Shared Alzheimer association and senior linkage line     Intro CCC and role and support if needed in future           Chief Complaint   Patient presents with    Clinic Care Coordination - Initial        Universal Utilization:       Utilization      No Show Count (past year)  1             ED Visits  1             Hospital Admissions  0                    Current as of: 7/15/2025  2:41 PM                Clinical Concerns:  Current Medical Concerns:    Patient Active Problem List   Diagnosis    Adenomatous colon polyp    Nicotine dependence    Carpal tunnel syndrome of left wrist    Benign essential hypertension    Pulmonary emphysema, unspecified emphysema type (H)    Former smoker    Abdominal aortic aneurysm (AAA) 30 to 34 mm in diameter    Atherosclerosis of abdominal aorta    Erectile dysfunction, unspecified erectile dysfunction type         Patient/Caregiver understanding: Pt/family reports understanding and denies any additional questions or concerns at this times. Care Coordinator engaged in AIDET communication during encounter.         Future Appointments                In 5 months Johnna Avilez MD [facility]            Plan: Patient will schedule and attend recommended follow up visits with speciality providers and primary care provider.  CCC will be available in future if needed, no further  outreach needed at this time

## 2025-07-15 NOTE — TELEPHONE ENCOUNTER
Attempt #1. No answer. LVM to call clinic. Please relay Dr. Rangel's message below upon return call.      Ref. Order signed     Marv SARABIA RN  [facility]

## 2025-07-21 NOTE — TELEPHONE ENCOUNTER
Writer called and relayed message below from Dr. Rangel. Patient's wife Renetta (CTC on file) verbalized understanding.    Marv SARABIA RN  [facility]

## 2025-08-13 DIAGNOSIS — G30.9 ALZHEIMER'S DISEASE (H): ICD-10-CM

## 2025-08-13 DIAGNOSIS — F02.80 ALZHEIMER'S DISEASE (H): ICD-10-CM

## 2025-08-13 RX ORDER — DONEPEZIL HYDROCHLORIDE 10 MG/1
10 TABLET, FILM COATED ORAL AT BEDTIME
Qty: 90 TABLET | Refills: 3 | Status: SHIPPED | OUTPATIENT
Start: 2025-08-13 | End: 2026-08-08

## (undated) DEVICE — SOL NACL 0.9% IRRIG 1000ML BOTTLE 2F7124

## (undated) DEVICE — PREP CHLORAPREP 26ML TINTED HI-LITE ORANGE 930815

## (undated) DEVICE — CUSTOM PACK MINOR SBA5BMNHEA

## (undated) DEVICE — SU MONOCRYL+ 4-0 18IN PS2 UND MCP496G

## (undated) DEVICE — PLATE GROUNDING ADULT W/CORD 9165L

## (undated) DEVICE — DRSG TEGADERM 4X4 3/4" 1626W

## (undated) DEVICE — SUTURE VICRYL+ 0 36IN CT-1 UND VCP946H

## (undated) DEVICE — ESU PENCIL SMOKE EVAC W/ROCKER SWITCH 0703-047-000

## (undated) DEVICE — SU VICRYL+ 3-0 27IN SH UND VCP416H

## (undated) DEVICE — TUBE PENROSE 1/4 X 12 STRL 30414-025

## (undated) DEVICE — DRSG GAUZE 4X4" 3033

## (undated) DEVICE — DRAPE OR LAPAROTOMY KC 89228*

## (undated) DEVICE — SOL WATER IRRIG 1000ML BOTTLE 2F7114

## (undated) DEVICE — SU VICRYL+ 0 27IN CT-2 UND VCP270H

## (undated) DEVICE — GLOVE BIOGEL PI ORTHOPRO SZ 7.5 47675

## (undated) DEVICE — SU PROLENE 0 CT-2 30" 8412H

## (undated) RX ORDER — ONDANSETRON 2 MG/ML
INJECTION INTRAMUSCULAR; INTRAVENOUS
Status: DISPENSED
Start: 2023-09-05

## (undated) RX ORDER — DEXAMETHASONE SODIUM PHOSPHATE 10 MG/ML
INJECTION, EMULSION INTRAMUSCULAR; INTRAVENOUS
Status: DISPENSED
Start: 2023-09-05

## (undated) RX ORDER — FENTANYL CITRATE-0.9 % NACL/PF 10 MCG/ML
PLASTIC BAG, INJECTION (ML) INTRAVENOUS
Status: DISPENSED
Start: 2023-09-05

## (undated) RX ORDER — LIDOCAINE HYDROCHLORIDE 10 MG/ML
INJECTION, SOLUTION EPIDURAL; INFILTRATION; INTRACAUDAL; PERINEURAL
Status: DISPENSED
Start: 2023-09-05

## (undated) RX ORDER — PROPOFOL 10 MG/ML
INJECTION, EMULSION INTRAVENOUS
Status: DISPENSED
Start: 2023-09-05

## (undated) RX ORDER — FENTANYL CITRATE 50 UG/ML
INJECTION, SOLUTION INTRAMUSCULAR; INTRAVENOUS
Status: DISPENSED
Start: 2023-09-05

## (undated) RX ORDER — GLYCOPYRROLATE 0.2 MG/ML
INJECTION, SOLUTION INTRAMUSCULAR; INTRAVENOUS
Status: DISPENSED
Start: 2023-09-05